# Patient Record
Sex: MALE | Race: WHITE | HISPANIC OR LATINO
[De-identification: names, ages, dates, MRNs, and addresses within clinical notes are randomized per-mention and may not be internally consistent; named-entity substitution may affect disease eponyms.]

---

## 2017-09-28 ENCOUNTER — RECORD ABSTRACTING (OUTPATIENT)
Age: 52
End: 2017-09-28

## 2017-09-28 DIAGNOSIS — J06.9 ACUTE UPPER RESPIRATORY INFECTION, UNSPECIFIED: ICD-10-CM

## 2017-09-28 DIAGNOSIS — Z87.09 PERSONAL HISTORY OF OTHER DISEASES OF THE RESPIRATORY SYSTEM: ICD-10-CM

## 2017-09-28 DIAGNOSIS — M25.472 EFFUSION, RIGHT ANKLE: ICD-10-CM

## 2017-09-28 DIAGNOSIS — M25.471 EFFUSION, RIGHT ANKLE: ICD-10-CM

## 2017-09-28 DIAGNOSIS — M25.519 PAIN IN UNSPECIFIED SHOULDER: ICD-10-CM

## 2017-09-30 ENCOUNTER — APPOINTMENT (OUTPATIENT)
Dept: FAMILY MEDICINE | Facility: CLINIC | Age: 52
End: 2017-09-30
Payer: COMMERCIAL

## 2017-09-30 VITALS
SYSTOLIC BLOOD PRESSURE: 122 MMHG | HEIGHT: 68.5 IN | OXYGEN SATURATION: 97 % | HEART RATE: 62 BPM | DIASTOLIC BLOOD PRESSURE: 88 MMHG | BODY MASS INDEX: 36.41 KG/M2 | WEIGHT: 243 LBS | RESPIRATION RATE: 14 BRPM

## 2017-09-30 VITALS — DIASTOLIC BLOOD PRESSURE: 84 MMHG | SYSTOLIC BLOOD PRESSURE: 132 MMHG

## 2017-09-30 DIAGNOSIS — Z82.49 FAMILY HISTORY OF ISCHEMIC HEART DISEASE AND OTHER DISEASES OF THE CIRCULATORY SYSTEM: ICD-10-CM

## 2017-09-30 DIAGNOSIS — Z81.8 FAMILY HISTORY OF OTHER MENTAL AND BEHAVIORAL DISORDERS: ICD-10-CM

## 2017-09-30 DIAGNOSIS — Z82.5 FAMILY HISTORY OF ASTHMA AND OTHER CHRONIC LOWER RESPIRATORY DISEASES: ICD-10-CM

## 2017-09-30 DIAGNOSIS — F15.90 OTHER STIMULANT USE, UNSPECIFIED, UNCOMPLICATED: ICD-10-CM

## 2017-09-30 PROCEDURE — 99213 OFFICE O/P EST LOW 20 MIN: CPT

## 2017-09-30 RX ORDER — AZELASTINE HYDROCHLORIDE AND FLUTICASONE PROPIONATE 137; 50 UG/1; UG/1
137-50 SPRAY, METERED NASAL
Refills: 0 | Status: DISCONTINUED | COMMUNITY
End: 2017-09-30

## 2018-01-13 ENCOUNTER — APPOINTMENT (OUTPATIENT)
Dept: FAMILY MEDICINE | Facility: CLINIC | Age: 53
End: 2018-01-13
Payer: COMMERCIAL

## 2018-01-13 VITALS — DIASTOLIC BLOOD PRESSURE: 82 MMHG | SYSTOLIC BLOOD PRESSURE: 120 MMHG

## 2018-01-13 VITALS
HEIGHT: 68.5 IN | SYSTOLIC BLOOD PRESSURE: 122 MMHG | HEART RATE: 65 BPM | DIASTOLIC BLOOD PRESSURE: 92 MMHG | OXYGEN SATURATION: 96 % | WEIGHT: 247 LBS | RESPIRATION RATE: 14 BRPM | BODY MASS INDEX: 37.01 KG/M2

## 2018-01-13 PROCEDURE — 99213 OFFICE O/P EST LOW 20 MIN: CPT

## 2018-01-13 RX ORDER — FLUTICASONE PROPIONATE 50 UG/1
50 SPRAY, METERED NASAL
Qty: 16 | Refills: 0 | Status: DISCONTINUED | COMMUNITY
Start: 2017-04-08 | End: 2018-01-13

## 2018-01-13 RX ORDER — PSYLLIUM HUSK 0.4 G
CAPSULE ORAL
Refills: 0 | Status: ACTIVE | COMMUNITY

## 2018-01-13 RX ORDER — METOPROLOL SUCCINATE 50 MG/1
50 TABLET, EXTENDED RELEASE ORAL
Qty: 30 | Refills: 0 | Status: DISCONTINUED | COMMUNITY
Start: 2017-07-10 | End: 2018-01-13

## 2018-04-21 ENCOUNTER — APPOINTMENT (OUTPATIENT)
Dept: FAMILY MEDICINE | Facility: CLINIC | Age: 53
End: 2018-04-21
Payer: COMMERCIAL

## 2018-04-21 ENCOUNTER — LABORATORY RESULT (OUTPATIENT)
Age: 53
End: 2018-04-21

## 2018-04-21 VITALS — SYSTOLIC BLOOD PRESSURE: 122 MMHG | DIASTOLIC BLOOD PRESSURE: 82 MMHG

## 2018-04-21 VITALS
HEIGHT: 68.5 IN | OXYGEN SATURATION: 97 % | BODY MASS INDEX: 37.01 KG/M2 | WEIGHT: 247 LBS | HEART RATE: 73 BPM | DIASTOLIC BLOOD PRESSURE: 98 MMHG | RESPIRATION RATE: 14 BRPM | SYSTOLIC BLOOD PRESSURE: 132 MMHG

## 2018-04-21 PROCEDURE — 99214 OFFICE O/P EST MOD 30 MIN: CPT | Mod: 25

## 2018-04-21 PROCEDURE — 36415 COLL VENOUS BLD VENIPUNCTURE: CPT

## 2018-04-21 RX ORDER — ACYCLOVIR 400 MG/1
400 TABLET ORAL 4 TIMES DAILY
Qty: 20 | Refills: 11 | Status: DISCONTINUED | COMMUNITY
Start: 2018-01-13 | End: 2018-04-21

## 2018-04-22 LAB
T3 SERPL-MCNC: 113 NG/DL
T3FREE SERPL-MCNC: 3 PG/ML
T4 FREE SERPL-MCNC: 0.9 NG/DL
T4 SERPL-MCNC: 6.2 UG/DL
THYROPEROXIDASE AB SERPL IA-ACNC: <10 IU/ML
TSH SERPL-ACNC: 0.74 UIU/ML

## 2018-04-24 ENCOUNTER — RESULT REVIEW (OUTPATIENT)
Age: 53
End: 2018-04-24

## 2018-04-24 LAB — ANA SER IF-ACNC: NEGATIVE

## 2018-04-26 ENCOUNTER — RESULT REVIEW (OUTPATIENT)
Age: 53
End: 2018-04-26

## 2018-04-26 LAB
BARLEY IGE QN: 0.13 KUA/L
CHERRY IGE QN: 1.03 KUA/L
COW MILK IGE QN: 9.76 KUA/L
CRAB IGE QN: <0.1 KUA/L
DEPRECATED BARLEY IGE RAST QL: NORMAL
DEPRECATED CHERRY IGE RAST QL: ABNORMAL
DEPRECATED COW MILK IGE RAST QL: ABNORMAL
DEPRECATED CRAB IGE RAST QL: 0
DEPRECATED EGG WHITE IGE RAST QL: NORMAL
DEPRECATED OAT IGE RAST QL: NORMAL
DEPRECATED PEANUT IGE RAST QL: NORMAL
DEPRECATED RYE IGE RAST QL: NORMAL
DEPRECATED SOYBEAN IGE RAST QL: NORMAL
DEPRECATED WHEAT IGE RAST QL: NORMAL
EGG WHITE IGE QN: 0.25 KUA/L
OAT IGE QN: 0.28 KUA/L
PEANUT IGE QN: 0.28 KUA/L
RYE IGE QN: 0.15 KUA/L
SOYBEAN IGE QN: 0.23 KUA/L
TOTAL IGE SMQN RAST: 372 KU/L
WHEAT IGE QN: 0.28 KUA/L

## 2018-07-21 ENCOUNTER — APPOINTMENT (OUTPATIENT)
Dept: FAMILY MEDICINE | Facility: CLINIC | Age: 53
End: 2018-07-21
Payer: COMMERCIAL

## 2018-07-21 VITALS — SYSTOLIC BLOOD PRESSURE: 122 MMHG | DIASTOLIC BLOOD PRESSURE: 80 MMHG

## 2018-07-21 VITALS
DIASTOLIC BLOOD PRESSURE: 82 MMHG | SYSTOLIC BLOOD PRESSURE: 118 MMHG | HEART RATE: 64 BPM | HEIGHT: 68.5 IN | RESPIRATION RATE: 14 BRPM | BODY MASS INDEX: 37.01 KG/M2 | WEIGHT: 247 LBS

## 2018-07-21 PROCEDURE — 99213 OFFICE O/P EST LOW 20 MIN: CPT

## 2018-07-21 NOTE — HISTORY OF PRESENT ILLNESS
[FreeTextEntry1] : 3 month check [de-identified] : He is feeling well.  He denies any health changes.

## 2018-07-21 NOTE — ASSESSMENT
[FreeTextEntry1] : he will continue current dose of metoprolol for stable hypertension and try to follow an active and healthy lifestyle and schedule a wellness visit with fasting labs in 3 months

## 2018-10-09 ENCOUNTER — RX RENEWAL (OUTPATIENT)
Age: 53
End: 2018-10-09

## 2018-10-20 ENCOUNTER — APPOINTMENT (OUTPATIENT)
Dept: FAMILY MEDICINE | Facility: CLINIC | Age: 53
End: 2018-10-20
Payer: COMMERCIAL

## 2018-10-20 ENCOUNTER — NON-APPOINTMENT (OUTPATIENT)
Age: 53
End: 2018-10-20

## 2018-10-20 VITALS
RESPIRATION RATE: 14 BRPM | WEIGHT: 247 LBS | HEIGHT: 68.5 IN | SYSTOLIC BLOOD PRESSURE: 118 MMHG | DIASTOLIC BLOOD PRESSURE: 80 MMHG | OXYGEN SATURATION: 98 % | TEMPERATURE: 98.6 F | HEART RATE: 87 BPM | BODY MASS INDEX: 37.01 KG/M2

## 2018-10-20 VITALS — DIASTOLIC BLOOD PRESSURE: 78 MMHG | SYSTOLIC BLOOD PRESSURE: 122 MMHG

## 2018-10-20 DIAGNOSIS — Z86.19 PERSONAL HISTORY OF OTHER INFECTIOUS AND PARASITIC DISEASES: ICD-10-CM

## 2018-10-20 DIAGNOSIS — Z87.2 PERSONAL HISTORY OF DISEASES OF THE SKIN AND SUBCUTANEOUS TISSUE: ICD-10-CM

## 2018-10-20 DIAGNOSIS — M67.432 GANGLION, LEFT WRIST: ICD-10-CM

## 2018-10-20 DIAGNOSIS — N52.9 MALE ERECTILE DYSFUNCTION, UNSPECIFIED: ICD-10-CM

## 2018-10-20 DIAGNOSIS — M67.472 GANGLION, LEFT ANKLE AND FOOT: ICD-10-CM

## 2018-10-20 LAB
BILIRUB UR QL STRIP: NEGATIVE
GLUCOSE UR-MCNC: NEGATIVE
HCG UR QL: 0.2 EU/DL
HGB UR QL STRIP.AUTO: NEGATIVE
KETONES UR-MCNC: NEGATIVE
LEUKOCYTE ESTERASE UR QL STRIP: NEGATIVE
NITRITE UR QL STRIP: NEGATIVE
PH UR STRIP: 7
PROT UR STRIP-MCNC: NEGATIVE
SP GR UR STRIP: 1.01

## 2018-10-20 PROCEDURE — G0008: CPT

## 2018-10-20 PROCEDURE — 99396 PREV VISIT EST AGE 40-64: CPT | Mod: 25

## 2018-10-20 PROCEDURE — 93000 ELECTROCARDIOGRAM COMPLETE: CPT

## 2018-10-20 PROCEDURE — 90686 IIV4 VACC NO PRSV 0.5 ML IM: CPT

## 2018-10-20 PROCEDURE — 81003 URINALYSIS AUTO W/O SCOPE: CPT | Mod: QW

## 2018-10-20 PROCEDURE — 36415 COLL VENOUS BLD VENIPUNCTURE: CPT

## 2018-10-20 NOTE — HISTORY OF PRESENT ILLNESS
[FreeTextEntry1] : Pt presents for cpe [de-identified] : He is feeling well.  He was released from the care of [facility] after the lymph node biopsy was negative and a period of surveillance.  He would like to renew Levitra that he takes occasionally

## 2018-10-20 NOTE — PHYSICAL EXAM
[No Acute Distress] : no acute distress [Well Nourished] : well nourished [Well Developed] : well developed [Well-Appearing] : well-appearing [Normal Voice/Communication] : normal voice/communication [Normal Sclera/Conjunctiva] : normal sclera/conjunctiva [Normal Outer Ear/Nose] : the outer ears and nose were normal in appearance [Normal Oropharynx] : the oropharynx was normal [Normal TMs] : both tympanic membranes were normal [Supple] : supple [Thyroid Normal, No Nodules] : the thyroid was normal and there were no nodules present [No Respiratory Distress] : no respiratory distress  [Clear to Auscultation] : lungs were clear to auscultation bilaterally [No Accessory Muscle Use] : no accessory muscle use [Normal Rate] : normal rate  [Regular Rhythm] : with a regular rhythm [Normal S1, S2] : normal S1 and S2 [No Murmur] : no murmur heard [No Carotid Bruits] : no carotid bruits [Soft] : abdomen soft [Non Tender] : non-tender [Non-distended] : non-distended [No HSM] : no HSM [Normal Bowel Sounds] : normal bowel sounds [Speech Grossly Normal] : speech grossly normal [Memory Grossly Normal] : memory grossly normal [Normal Affect] : the affect was normal [Normal Mood] : the mood was normal [Normal Insight/Judgement] : insight and judgment were intact [de-identified] : soft, mobile mass in left anterior neck

## 2018-10-20 NOTE — ASSESSMENT
[FreeTextEntry1] : flu vaccine was given, medications were renewed, fasting labs will be drawn in the office today, I ordered a neck ultrasound

## 2018-10-20 NOTE — HEALTH RISK ASSESSMENT
[Good] : ~his/her~  mood as  good [Intercurrent Urgi Care visits] : went to urgent care [No falls in past year] : Patient reported no falls in the past year [0] : 2) Feeling down, depressed, or hopeless: Not at all (0) [HIV Test offered] : HIV Test offered [Hepatitis C test offered] : Hepatitis C test offered [None] : None [With Family] : lives with family [Employed] : employed [College] : College [] :  [Sexually Active] : sexually active [Feels Safe at Home] : Feels safe at home [Fully functional (bathing, dressing, toileting, transferring, walking, feeding)] : Fully functional (bathing, dressing, toileting, transferring, walking, feeding) [Fully functional (using the telephone, shopping, preparing meals, housekeeping, doing laundry, using] : Fully functional and needs no help or supervision to perform IADLs (using the telephone, shopping, preparing meals, housekeeping, doing laundry, using transportation, managing medications and managing finances) [Smoke Detector] : smoke detector [Carbon Monoxide Detector] : carbon monoxide detector [Seat Belt] :  uses seat belt [Sunscreen] : uses sunscreen [Patient declined discussion] : Patient declined discussion [FreeTextEntry1] : none [] : No [de-identified] : hand laceration [de-identified] : none [Change in mental status noted] : No change in mental status noted [Language] : denies difficulty with language [Behavior] : denies difficulty with behavior [Learning/Retaining New Information] : denies difficulty learning/retaining new information [Handling Complex Tasks] : denies difficulty handling complex tasks [Reasoning] : denies difficulty with reasoning [Spatial Ability and Orientation] : denies difficulty with spatial ability and orientation [Reports changes in hearing] : Reports no changes in hearing [Reports changes in vision] : Reports no changes in vision [Reports changes in dental health] : Reports no changes in dental health [Guns at Home] : no guns at home [Safety elements used in home] : no safety elements used in home [Travel to Developing Areas] : does not  travel to developing areas [TB Exposure] : is not being exposed to tuberculosis [Caregiver Concerns] : does not have caregiver concerns [ColonoscopyComments] : plans to schedule one [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

## 2018-10-22 DIAGNOSIS — Z13.29 ENCOUNTER FOR SCREENING FOR OTHER SUSPECTED ENDOCRINE DISORDER: ICD-10-CM

## 2018-10-22 DIAGNOSIS — Z12.5 ENCOUNTER FOR SCREENING FOR MALIGNANT NEOPLASM OF PROSTATE: ICD-10-CM

## 2018-10-22 DIAGNOSIS — Z13.1 ENCOUNTER FOR SCREENING FOR DIABETES MELLITUS: ICD-10-CM

## 2018-10-22 DIAGNOSIS — Z11.4 ENCOUNTER FOR SCREENING FOR HUMAN IMMUNODEFICIENCY VIRUS [HIV]: ICD-10-CM

## 2018-10-22 DIAGNOSIS — Z11.59 ENCOUNTER FOR SCREENING FOR OTHER VIRAL DISEASES: ICD-10-CM

## 2018-10-22 DIAGNOSIS — Z13.220 ENCOUNTER FOR SCREENING FOR LIPOID DISORDERS: ICD-10-CM

## 2018-10-22 DIAGNOSIS — E78.6 LIPOPROTEIN DEFICIENCY: ICD-10-CM

## 2018-10-22 DIAGNOSIS — Z13.0 ENCOUNTER FOR SCREENING FOR DISEASES OF THE BLOOD AND BLOOD-FORMING ORGANS AND CERTAIN DISORDERS INVOLVING THE IMMUNE MECHANISM: ICD-10-CM

## 2018-10-22 LAB
25(OH)D3 SERPL-MCNC: 26 NG/ML
ALBUMIN SERPL ELPH-MCNC: 4.4 G/DL
ALP BLD-CCNC: 81 U/L
ALT SERPL-CCNC: 42 U/L
ANION GAP SERPL CALC-SCNC: 13 MMOL/L
AST SERPL-CCNC: 37 U/L
BASOPHILS # BLD AUTO: 0.03 K/UL
BASOPHILS NFR BLD AUTO: 0.6 %
BILIRUB SERPL-MCNC: 0.3 MG/DL
BUN SERPL-MCNC: 15 MG/DL
CALCIUM SERPL-MCNC: 9.8 MG/DL
CHLORIDE SERPL-SCNC: 102 MMOL/L
CHOLEST SERPL-MCNC: 158 MG/DL
CHOLEST/HDLC SERPL: 4.8 RATIO
CO2 SERPL-SCNC: 27 MMOL/L
CREAT SERPL-MCNC: 1.21 MG/DL
EOSINOPHIL # BLD AUTO: 0.33 K/UL
EOSINOPHIL NFR BLD AUTO: 6.8 %
FERRITIN SERPL-MCNC: 112 NG/ML
FOLATE SERPL-MCNC: >20 NG/ML
GLUCOSE SERPL-MCNC: 103 MG/DL
HBA1C MFR BLD HPLC: 5.9 %
HCT VFR BLD CALC: 44.1 %
HCV AB SER QL: NONREACTIVE
HCV S/CO RATIO: 0.11 S/CO
HDLC SERPL-MCNC: 33 MG/DL
HGB BLD-MCNC: 15.3 G/DL
HIV1+2 AB SPEC QL IA.RAPID: NONREACTIVE
IMM GRANULOCYTES NFR BLD AUTO: 0 %
LDLC SERPL CALC-MCNC: 99 MG/DL
LYMPHOCYTES # BLD AUTO: 1.77 K/UL
LYMPHOCYTES NFR BLD AUTO: 36.4 %
MAN DIFF?: NORMAL
MCHC RBC-ENTMCNC: 31.1 PG
MCHC RBC-ENTMCNC: 34.7 GM/DL
MCV RBC AUTO: 89.6 FL
MONOCYTES # BLD AUTO: 0.34 K/UL
MONOCYTES NFR BLD AUTO: 7 %
NEUTROPHILS # BLD AUTO: 2.39 K/UL
NEUTROPHILS NFR BLD AUTO: 49.2 %
PLATELET # BLD AUTO: 213 K/UL
POTASSIUM SERPL-SCNC: 4.7 MMOL/L
PROT SERPL-MCNC: 7 G/DL
PSA SERPL-MCNC: 0.49 NG/ML
RBC # BLD: 4.92 M/UL
RBC # FLD: 13.2 %
SODIUM SERPL-SCNC: 142 MMOL/L
TRIGL SERPL-MCNC: 130 MG/DL
TSH SERPL-ACNC: 1.15 UIU/ML
VIT B12 SERPL-MCNC: 520 PG/ML
WBC # FLD AUTO: 4.86 K/UL

## 2018-11-12 ENCOUNTER — RX RENEWAL (OUTPATIENT)
Age: 53
End: 2018-11-12

## 2018-11-12 RX ORDER — VARDENAFIL HYDROCHLORIDE 20 MG/1
20 TABLET, FILM COATED ORAL
Qty: 12 | Refills: 11 | Status: DISCONTINUED | COMMUNITY
Start: 2018-10-20 | End: 2018-11-12

## 2019-01-14 ENCOUNTER — MEDICATION RENEWAL (OUTPATIENT)
Age: 54
End: 2019-01-14

## 2019-01-19 ENCOUNTER — APPOINTMENT (OUTPATIENT)
Dept: FAMILY MEDICINE | Facility: CLINIC | Age: 54
End: 2019-01-19
Payer: COMMERCIAL

## 2019-01-19 VITALS
OXYGEN SATURATION: 96 % | DIASTOLIC BLOOD PRESSURE: 76 MMHG | WEIGHT: 241 LBS | BODY MASS INDEX: 36.11 KG/M2 | HEART RATE: 78 BPM | HEIGHT: 68.5 IN | SYSTOLIC BLOOD PRESSURE: 124 MMHG

## 2019-01-19 PROCEDURE — 99213 OFFICE O/P EST LOW 20 MIN: CPT

## 2019-01-19 NOTE — REVIEW OF SYSTEMS
[Recent Change In Weight] : ~T recent weight change [Negative] : Heme/Lymph [FreeTextEntry2] : weight loss

## 2019-01-19 NOTE — ASSESSMENT
[FreeTextEntry1] : he was advised to schedule the neck ultrasound, he was advised to continue the low carb diet and to add regular exercise, medications were renewed, he will follow up in 3 months for a BP check and a HgA1C

## 2019-01-19 NOTE — HISTORY OF PRESENT ILLNESS
[FreeTextEntry1] : Pt presents for 3 month follow up [de-identified] : He has been following a lower carb diet, he cut down on bread and sweets and has decreased the amount of sugar in his coffee.  He has lost a little weight.  He plans to start exercising.  He plans to schedule the neck ultrasound.

## 2019-02-07 ENCOUNTER — RX RENEWAL (OUTPATIENT)
Age: 54
End: 2019-02-07

## 2019-04-20 ENCOUNTER — APPOINTMENT (OUTPATIENT)
Dept: FAMILY MEDICINE | Facility: CLINIC | Age: 54
End: 2019-04-20
Payer: COMMERCIAL

## 2019-04-20 VITALS
SYSTOLIC BLOOD PRESSURE: 122 MMHG | OXYGEN SATURATION: 97 % | DIASTOLIC BLOOD PRESSURE: 86 MMHG | BODY MASS INDEX: 35.21 KG/M2 | HEART RATE: 79 BPM | HEIGHT: 68.5 IN | WEIGHT: 235 LBS | RESPIRATION RATE: 14 BRPM

## 2019-04-20 DIAGNOSIS — R59.0 LOCALIZED ENLARGED LYMPH NODES: ICD-10-CM

## 2019-04-20 PROCEDURE — 99214 OFFICE O/P EST MOD 30 MIN: CPT

## 2019-04-20 NOTE — PLAN
[FreeTextEntry1] : will defer repeat labs until october, he will continue the healthy diet and weight loss and incorporate exercise, he will take amoxicillin and use fluticasone as directed, I renewed metoprolol, I ordered a repeat neck ultrasound and biopsy, he will follow up in 3 months

## 2019-04-20 NOTE — REVIEW OF SYSTEMS
[Nasal Discharge] : nasal discharge [Postnasal Drip] : postnasal drip [Swollen Glands] : swollen glands [Negative] : Psychiatric [Fever] : no fever [Chills] : no chills [Earache] : no earache [Sore Throat] : no sore throat [Shortness Of Breath] : no shortness of breath [Wheezing] : no wheezing [Cough] : no cough

## 2019-04-20 NOTE — PHYSICAL EXAM
[No Acute Distress] : no acute distress [Well Developed] : well developed [Well Nourished] : well nourished [Normal Voice/Communication] : normal voice/communication [Well-Appearing] : well-appearing [Normal Outer Ear/Nose] : the outer ears and nose were normal in appearance [Normal Sclera/Conjunctiva] : normal sclera/conjunctiva [Normal TMs] : both tympanic membranes were normal [Normal Oropharynx] : the oropharynx was normal [No Respiratory Distress] : no respiratory distress  [Supple] : supple [Clear to Auscultation] : lungs were clear to auscultation bilaterally [No Accessory Muscle Use] : no accessory muscle use [Normal Rate] : normal rate  [Regular Rhythm] : with a regular rhythm [Speech Grossly Normal] : speech grossly normal [Normal S1, S2] : normal S1 and S2 [Memory Grossly Normal] : memory grossly normal [Normal Affect] : the affect was normal [Normal Mood] : the mood was normal [Normal Insight/Judgement] : insight and judgment were intact [de-identified] : erythema and edema in nasal passages bilaterally [de-identified] : large, mobile left submandibular node or cluster of nodes

## 2019-04-20 NOTE — HISTORY OF PRESENT ILLNESS
[FreeTextEntry1] : Pt presents for 3 month follow up [de-identified] : He has decreased intake of sweets and carbs and has lost weight and plans to start exercising.  He has been having a lot of sinus congestion due to allergies and now has discolored nasal discharge and sinus pressure.  He would like to schedule the neck ultrasound and another fine needle biopsy of the lymph node at the same time.

## 2019-06-05 ENCOUNTER — RESULT REVIEW (OUTPATIENT)
Age: 54
End: 2019-06-05

## 2019-06-09 ENCOUNTER — TRANSCRIPTION ENCOUNTER (OUTPATIENT)
Age: 54
End: 2019-06-09

## 2019-07-03 ENCOUNTER — APPOINTMENT (OUTPATIENT)
Dept: COLORECTAL SURGERY | Facility: CLINIC | Age: 54
End: 2019-07-03

## 2019-07-20 ENCOUNTER — APPOINTMENT (OUTPATIENT)
Dept: FAMILY MEDICINE | Facility: CLINIC | Age: 54
End: 2019-07-20

## 2019-08-08 ENCOUNTER — RX RENEWAL (OUTPATIENT)
Age: 54
End: 2019-08-08

## 2019-08-12 ENCOUNTER — APPOINTMENT (OUTPATIENT)
Dept: FAMILY MEDICINE | Facility: CLINIC | Age: 54
End: 2019-08-12
Payer: COMMERCIAL

## 2019-08-12 VITALS
OXYGEN SATURATION: 97 % | HEART RATE: 83 BPM | RESPIRATION RATE: 14 BRPM | DIASTOLIC BLOOD PRESSURE: 88 MMHG | HEIGHT: 68.5 IN | BODY MASS INDEX: 33.11 KG/M2 | SYSTOLIC BLOOD PRESSURE: 120 MMHG | WEIGHT: 221 LBS

## 2019-08-12 VITALS — DIASTOLIC BLOOD PRESSURE: 72 MMHG | SYSTOLIC BLOOD PRESSURE: 120 MMHG

## 2019-08-12 DIAGNOSIS — J01.90 ACUTE SINUSITIS, UNSPECIFIED: ICD-10-CM

## 2019-08-12 PROCEDURE — 99213 OFFICE O/P EST LOW 20 MIN: CPT

## 2019-08-12 RX ORDER — AMOXICILLIN 875 MG/1
875 TABLET, FILM COATED ORAL
Qty: 20 | Refills: 0 | Status: DISCONTINUED | COMMUNITY
Start: 2019-04-20 | End: 2019-08-12

## 2019-08-12 NOTE — PLAN
[FreeTextEntry1] : I renewed metoprolol and fluticasone, he [place] [facility] [place] and will follow up with me in 3 months for a CPE and BP check, he was given emotional support

## 2019-08-12 NOTE — PHYSICAL EXAM
[No Acute Distress] : no acute distress [Well Nourished] : well nourished [Well Developed] : well developed [Well-Appearing] : well-appearing [Normal Voice/Communication] : normal voice/communication [No Respiratory Distress] : no respiratory distress  [No Accessory Muscle Use] : no accessory muscle use [Clear to Auscultation] : lungs were clear to auscultation bilaterally [Normal Rate] : normal rate  [Regular Rhythm] : with a regular rhythm [Normal S1, S2] : normal S1 and S2 [Speech Grossly Normal] : speech grossly normal [Memory Grossly Normal] : memory grossly normal [Normal Mood] : the mood was normal [Normal Insight/Judgement] : insight and judgment were intact

## 2019-08-12 NOTE — HISTORY OF PRESENT ILLNESS
[de-identified] : He was diagnosed with colon cancer with mets to the liver and lungs.  [place] [facility] [place] every 2 weeks.  He is tolerating it pretty well so far.  He had been following a healthy diet and was slowly losing weight and then lost a little more weight with his digestive symptoms and now his weight has stabilized. [FreeTextEntry1] : Patient present for med check\par

## 2019-08-12 NOTE — REVIEW OF SYSTEMS
[Recent Change In Weight] : ~T recent weight change [Fatigue] : fatigue [Diarrhea] : diarrhea [Negative] : Heme/Lymph

## 2019-09-14 ENCOUNTER — APPOINTMENT (OUTPATIENT)
Dept: FAMILY MEDICINE | Facility: CLINIC | Age: 54
End: 2019-09-14

## 2019-11-16 ENCOUNTER — APPOINTMENT (OUTPATIENT)
Dept: FAMILY MEDICINE | Facility: CLINIC | Age: 54
End: 2019-11-16
Payer: COMMERCIAL

## 2019-11-16 ENCOUNTER — NON-APPOINTMENT (OUTPATIENT)
Age: 54
End: 2019-11-16

## 2019-11-16 VITALS
DIASTOLIC BLOOD PRESSURE: 100 MMHG | SYSTOLIC BLOOD PRESSURE: 152 MMHG | WEIGHT: 235 LBS | OXYGEN SATURATION: 96 % | RESPIRATION RATE: 14 BRPM | HEIGHT: 68.5 IN | BODY MASS INDEX: 35.21 KG/M2 | HEART RATE: 72 BPM

## 2019-11-16 VITALS — SYSTOLIC BLOOD PRESSURE: 134 MMHG | DIASTOLIC BLOOD PRESSURE: 100 MMHG

## 2019-11-16 DIAGNOSIS — Z00.00 ENCOUNTER FOR GENERAL ADULT MEDICAL EXAMINATION W/OUT ABNORMAL FINDINGS: ICD-10-CM

## 2019-11-16 LAB
BILIRUB UR QL STRIP: NEGATIVE
GLUCOSE UR-MCNC: NEGATIVE
HCG UR QL: 2 EU/DL
HGB UR QL STRIP.AUTO: NEGATIVE
KETONES UR-MCNC: NEGATIVE
LEUKOCYTE ESTERASE UR QL STRIP: NEGATIVE
NITRITE UR QL STRIP: NEGATIVE
PH UR STRIP: 6.5
PROT UR STRIP-MCNC: 30
SP GR UR STRIP: 1.02

## 2019-11-16 PROCEDURE — 99396 PREV VISIT EST AGE 40-64: CPT | Mod: 25

## 2019-11-16 PROCEDURE — 36415 COLL VENOUS BLD VENIPUNCTURE: CPT

## 2019-11-16 PROCEDURE — 93000 ELECTROCARDIOGRAM COMPLETE: CPT

## 2019-11-16 PROCEDURE — 81003 URINALYSIS AUTO W/O SCOPE: CPT | Mod: QW

## 2019-11-16 NOTE — REVIEW OF SYSTEMS
[Recent Change In Weight] : ~T recent weight change [Negative] : Heme/Lymph [FreeTextEntry2] : weight gain [de-identified] : possible fungal infection of right big toe

## 2019-11-16 NOTE — PLAN
[FreeTextEntry1] : labs will be drawn in the office today to further assess his health, he will continue current dose of metoprolol ER and will add amlodipine 5 mg daily, he was advised that the nail doesn't appear to have a fungal infection and the changes may be due to chemo

## 2019-11-16 NOTE — PHYSICAL EXAM
[No Acute Distress] : no acute distress [Well Nourished] : well nourished [Well Developed] : well developed [Well-Appearing] : well-appearing [Normal Voice/Communication] : normal voice/communication [Normal Sclera/Conjunctiva] : normal sclera/conjunctiva [Normal TMs] : both tympanic membranes were normal [Normal Outer Ear/Nose] : the outer ears and nose were normal in appearance [Normal Oropharynx] : the oropharynx was normal [Supple] : supple [No JVD] : no jugular venous distention [No Lymphadenopathy] : no lymphadenopathy [No Respiratory Distress] : no respiratory distress  [Normal Rate] : normal rate  [Clear to Auscultation] : lungs were clear to auscultation bilaterally [No Accessory Muscle Use] : no accessory muscle use [Normal S1, S2] : normal S1 and S2 [No Murmur] : no murmur heard [Regular Rhythm] : with a regular rhythm [No Carotid Bruits] : no carotid bruits [No Edema] : there was no peripheral edema [Soft] : abdomen soft [Non-distended] : non-distended [No HSM] : no HSM [Non Tender] : non-tender [Normal Bowel Sounds] : normal bowel sounds [Coordination Grossly Intact] : coordination grossly intact [Memory Grossly Normal] : memory grossly normal [No Focal Deficits] : no focal deficits [Speech Grossly Normal] : speech grossly normal [Normal Insight/Judgement] : insight and judgment were intact [Normal Mood] : the mood was normal [Normal Affect] : the affect was normal [de-identified] : pitting of right big toe, no yellow discoloration or thickening noted

## 2019-11-16 NOTE — HEALTH RISK ASSESSMENT
[Poor] : ~his/her~ current health as poor [Good] : ~his/her~  mood as  good [0-5] : 0-5 [No falls in past year] : Patient reported no falls in the past year [HIV test declined] : HIV test declined [None] : None [With Family] : lives with family [College] : College [Employed] : employed [] :  [Feels Safe at Home] : Feels safe at home [Fully functional (bathing, dressing, toileting, transferring, walking, feeding)] : Fully functional (bathing, dressing, toileting, transferring, walking, feeding) [Fully functional (using the telephone, shopping, preparing meals, housekeeping, doing laundry, using] : Fully functional and needs no help or supervision to perform IADLs (using the telephone, shopping, preparing meals, housekeeping, doing laundry, using transportation, managing medications and managing finances) [Reports normal functional visual acuity (ie: able to read med bottle)] : Reports normal functional visual acuity [Smoke Detector] : smoke detector [Carbon Monoxide Detector] : carbon monoxide detector [Safety elements used in home] : safety elements used in home [Seat Belt] :  uses seat belt [Sunscreen] : uses sunscreen [Patient/Caregiver not ready to engage] : Patient/Caregiver not ready to engage [] : No [FreeTextEntry1] : BP is elevated, possible fungus on right big toenail [de-identified] : nothing recently [de-identified] : watching his sugars and carbs [de-identified] : Dr Grace Boston (Onc) [de-identified] : tries to stay active [Change in mental status noted] : No change in mental status noted [Language] : denies difficulty with language [Learning/Retaining New Information] : denies difficulty learning/retaining new information [Behavior] : denies difficulty with behavior [Reports changes in hearing] : Reports no changes in hearing [Handling Complex Tasks] : denies difficulty handling complex tasks [Spatial Ability and Orientation] : denies difficulty with spatial ability and orientation [Reasoning] : denies difficulty with reasoning [Reports changes in vision] : Reports no changes in vision [Guns at Home] : no guns at home [Reports changes in dental health] : Reports no changes in dental health [Travel to Developing Areas] : does not  travel to developing areas [TB Exposure] : is not being exposed to tuberculosis [Caregiver Concerns] : does not have caregiver concerns [HIVComments] : [place] [facility] [place]FreeTextEntry2] :  [AdvancecareDate] : 11/19

## 2019-11-16 NOTE — HISTORY OF PRESENT ILLNESS
[FreeTextEntry1] : patient presents for physical\par  [de-identified] : He is still receiving chemotherapy every 2 weeks except for this week as his platelet count and WBC counts were low.  His weight had dropped initially but since then he has gained weight.

## 2019-11-17 ENCOUNTER — MOBILE ON CALL (OUTPATIENT)
Age: 54
End: 2019-11-17

## 2019-11-18 LAB
ALBUMIN SERPL ELPH-MCNC: 4.1 G/DL
ALP BLD-CCNC: 103 U/L
ALT SERPL-CCNC: 36 U/L
ANION GAP SERPL CALC-SCNC: 12 MMOL/L
AST SERPL-CCNC: 31 U/L
BILIRUB SERPL-MCNC: 0.5 MG/DL
BUN SERPL-MCNC: 16 MG/DL
CALCIUM SERPL-MCNC: 9 MG/DL
CHLORIDE SERPL-SCNC: 104 MMOL/L
CHOLEST SERPL-MCNC: 169 MG/DL
CHOLEST/HDLC SERPL: 4.6 RATIO
CO2 SERPL-SCNC: 26 MMOL/L
CREAT SERPL-MCNC: 1.04 MG/DL
ESTIMATED AVERAGE GLUCOSE: 137 MG/DL
GLUCOSE SERPL-MCNC: 118 MG/DL
HBA1C MFR BLD HPLC: 6.4 %
HDLC SERPL-MCNC: 37 MG/DL
LDLC SERPL CALC-MCNC: 89 MG/DL
POTASSIUM SERPL-SCNC: 4.2 MMOL/L
PROT SERPL-MCNC: 6.9 G/DL
SODIUM SERPL-SCNC: 142 MMOL/L
TRIGL SERPL-MCNC: 216 MG/DL

## 2019-11-21 ENCOUNTER — APPOINTMENT (OUTPATIENT)
Dept: FAMILY MEDICINE | Facility: CLINIC | Age: 54
End: 2019-11-21
Payer: COMMERCIAL

## 2019-11-21 VITALS
SYSTOLIC BLOOD PRESSURE: 160 MMHG | HEIGHT: 68.5 IN | BODY MASS INDEX: 35.21 KG/M2 | DIASTOLIC BLOOD PRESSURE: 100 MMHG | HEART RATE: 72 BPM | OXYGEN SATURATION: 98 % | RESPIRATION RATE: 14 BRPM | WEIGHT: 235 LBS

## 2019-11-21 VITALS — DIASTOLIC BLOOD PRESSURE: 92 MMHG | SYSTOLIC BLOOD PRESSURE: 148 MMHG

## 2019-11-21 PROCEDURE — 99214 OFFICE O/P EST MOD 30 MIN: CPT

## 2019-11-21 NOTE — ASSESSMENT
[FreeTextEntry1] : Consulted with Dr. Carpenter, continue amlodipine 5mg daily in conjunction with metoprolol. \par RTO in 1 week for bp check \par PT will call office if bp persistently >170/100 to come in sooner or receive further instruction. If any cp, sob, palpitations occur pt will go directly to Ed for evaluation.

## 2019-11-21 NOTE — HISTORY OF PRESENT ILLNESS
[FreeTextEntry1] : pt presents for blood pressure check  [de-identified] : patient presents today with a chief complaints of high blood pressure. Reports associated headaches that come and go. States he goes to [facility] for chemo infusions, has noticed that his bp increases since he started them. He has only taken 2 doses of the amlodipine so far. His bp readings yesterday were 148/90, 171/104, 173/118, and 185/111 at 425 am which prompted him to go to the ER. Bp stabilized in ER and he was sent home. He has not taken his amlodipine yet today. Today at 1115 am bp was 160/115. Denies any chest pain, sob, palpitations, swelling, diaphoresis.

## 2019-11-27 ENCOUNTER — APPOINTMENT (OUTPATIENT)
Dept: FAMILY MEDICINE | Facility: CLINIC | Age: 54
End: 2019-11-27
Payer: COMMERCIAL

## 2019-11-27 VITALS
WEIGHT: 235 LBS | RESPIRATION RATE: 14 BRPM | HEIGHT: 68.5 IN | BODY MASS INDEX: 35.21 KG/M2 | DIASTOLIC BLOOD PRESSURE: 84 MMHG | OXYGEN SATURATION: 97 % | SYSTOLIC BLOOD PRESSURE: 132 MMHG | HEART RATE: 87 BPM

## 2019-11-27 VITALS — DIASTOLIC BLOOD PRESSURE: 86 MMHG | SYSTOLIC BLOOD PRESSURE: 146 MMHG

## 2019-11-27 DIAGNOSIS — Z87.898 PERSONAL HISTORY OF OTHER SPECIFIED CONDITIONS: ICD-10-CM

## 2019-11-27 PROCEDURE — 99213 OFFICE O/P EST LOW 20 MIN: CPT

## 2019-11-27 NOTE — ASSESSMENT
[FreeTextEntry1] : Bp elevated but improved, continue to monitor bp at home twice daily. RTO in 3 weeks for follow up. Pt will RTO sooner if bp persistently 160/90. Pt will go to ER if chest pain or sob arise. pt advised to follow low salt, low fat diet. Pt encouraged to exercise and increase weight loss efforts.\par

## 2019-11-27 NOTE — HISTORY OF PRESENT ILLNESS
[FreeTextEntry1] : Pt presents for follow up [de-identified] : Patient reports today for follow up of hypertension. Patient states he was still getting elevated reading of 160s and 170s systolic up until about 2 days ago, he tried taking 10mg of amlodipine. His blood pressures improved to 140s and 150s systolic. His headaches have now stopped. Denies chest pains, palpitations, sob, dizziness, edema.

## 2019-12-12 ENCOUNTER — APPOINTMENT (OUTPATIENT)
Dept: FAMILY MEDICINE | Facility: CLINIC | Age: 54
End: 2019-12-12
Payer: COMMERCIAL

## 2019-12-12 VITALS
DIASTOLIC BLOOD PRESSURE: 82 MMHG | HEART RATE: 73 BPM | RESPIRATION RATE: 14 BRPM | WEIGHT: 235 LBS | BODY MASS INDEX: 35.21 KG/M2 | HEIGHT: 68.5 IN | OXYGEN SATURATION: 98 % | SYSTOLIC BLOOD PRESSURE: 140 MMHG

## 2019-12-12 PROCEDURE — 99214 OFFICE O/P EST MOD 30 MIN: CPT

## 2019-12-12 NOTE — ASSESSMENT
[FreeTextEntry1] : BP well controlled today\par Edema: check echo, ?amlodipine, hctz prn - advised to drink plenty of water, pt advised to avoid salt, elevate legs above the level of the heart, get compression stocking. Pt advised to also mention it to his oncologist - he has appt with them tomorrow. Pt advised if any calf pain, chest pain, sob, erythema arises to notify the office immediately or go directly to nearest ED for urgent evaluation. Pt agreeable to plan.

## 2019-12-12 NOTE — PHYSICAL EXAM
[Normal Sclera/Conjunctiva] : normal sclera/conjunctiva [Normal Outer Ear/Nose] : the outer ears and nose were normal in appearance [Normal] : normal gait, coordination grossly intact, no focal deficits and deep tendon reflexes were 2+ and symmetric [Pedal Pulses Present] : the pedal pulses are present [de-identified] : 1+ bilateral lower extremity edema, no redness or tenderness to palpation, bilateral Homans sign negative.

## 2019-12-12 NOTE — HISTORY OF PRESENT ILLNESS
[FreeTextEntry8] : patient reports today with a chief complaint of bilateral lower extremity swelling x 2 days. States swelling was better in the morning, he noticed it at lunch. He admits he does eat cold cuts but has not had overly salty foods. Taking blood pressure meds as prescribed. Denies chest pain, sob, palpitations, calf pain, redness, weeping, numbness/tingling, weakness, headache.

## 2019-12-28 ENCOUNTER — APPOINTMENT (OUTPATIENT)
Dept: FAMILY MEDICINE | Facility: CLINIC | Age: 54
End: 2019-12-28
Payer: COMMERCIAL

## 2019-12-28 VITALS
HEIGHT: 68 IN | RESPIRATION RATE: 12 BRPM | HEART RATE: 73 BPM | DIASTOLIC BLOOD PRESSURE: 80 MMHG | SYSTOLIC BLOOD PRESSURE: 130 MMHG | OXYGEN SATURATION: 98 % | BODY MASS INDEX: 36.37 KG/M2 | WEIGHT: 240 LBS

## 2019-12-28 PROCEDURE — 99214 OFFICE O/P EST MOD 30 MIN: CPT

## 2019-12-28 RX ORDER — OMEPRAZOLE 20 MG/1
20 CAPSULE, DELAYED RELEASE ORAL
Qty: 30 | Refills: 0 | Status: COMPLETED | COMMUNITY
Start: 2019-12-04

## 2019-12-28 RX ORDER — HYDROCHLOROTHIAZIDE 12.5 MG/1
12.5 TABLET ORAL DAILY
Qty: 10 | Refills: 0 | Status: DISCONTINUED | COMMUNITY
Start: 2019-12-12 | End: 2019-12-28

## 2019-12-28 RX ORDER — AMLODIPINE BESYLATE 10 MG/1
10 TABLET ORAL
Qty: 90 | Refills: 1 | Status: DISCONTINUED | COMMUNITY
Start: 2019-11-16 | End: 2019-12-28

## 2019-12-28 NOTE — HISTORY OF PRESENT ILLNESS
[FreeTextEntry1] : pt presents for follow up  [de-identified] : Patient reports today for follow up of hypertension. Patient states bilateral lower extremity swelling persists, but has remained stable. Denies chest pains, palpitations, sob, dizziness, redness, calf pain. He had dopplers through [facility] - both negative.  He would like to switch from amlodipine, he never took hctz.

## 2019-12-28 NOTE — ASSESSMENT
[FreeTextEntry1] : RTO in 2 weeks for follow up\par Take readings at home daily \par RTO sooner if bp elevates >150/90\par Drink plenty of fluids, avoid salt.

## 2019-12-28 NOTE — PHYSICAL EXAM
[Normal Sclera/Conjunctiva] : normal sclera/conjunctiva [Normal Outer Ear/Nose] : the outer ears and nose were normal in appearance [Normal] : affect was normal and insight and judgment were intact [de-identified] : 1+ bilateral LE swelling

## 2020-01-11 ENCOUNTER — APPOINTMENT (OUTPATIENT)
Dept: FAMILY MEDICINE | Facility: CLINIC | Age: 55
End: 2020-01-11
Payer: COMMERCIAL

## 2020-01-11 VITALS
SYSTOLIC BLOOD PRESSURE: 120 MMHG | DIASTOLIC BLOOD PRESSURE: 80 MMHG | OXYGEN SATURATION: 98 % | BODY MASS INDEX: 36.22 KG/M2 | HEART RATE: 68 BPM | HEIGHT: 68 IN | WEIGHT: 239 LBS | RESPIRATION RATE: 12 BRPM

## 2020-01-11 DIAGNOSIS — R60.0 LOCALIZED EDEMA: ICD-10-CM

## 2020-01-11 PROCEDURE — 90674 CCIIV4 VAC NO PRSV 0.5 ML IM: CPT

## 2020-01-11 PROCEDURE — 99214 OFFICE O/P EST MOD 30 MIN: CPT | Mod: 25

## 2020-01-11 PROCEDURE — G0008: CPT

## 2020-01-11 NOTE — HISTORY OF PRESENT ILLNESS
[FreeTextEntry1] : pt presents for follow up  [de-identified] : Patient reports today for follow up of hypertension. Patient reports doing well overall. Tolerating losartan well without side effects. States he had a few high readings in the beginning but they have improved since then. He was 130/90 at [facility] yesterday for his chemo infusion.

## 2020-01-11 NOTE — ASSESSMENT
[FreeTextEntry1] : Vitals and exam stable \par Continue bp medication regimen\par Patient will continue low carb, low fat dietary efforts\par RTO in 3 mos or sooner if needed\par Flu shot admin to L Deltoid, tolerated well. \par

## 2020-01-18 ENCOUNTER — CLINICAL ADVICE (OUTPATIENT)
Age: 55
End: 2020-01-18

## 2020-01-25 ENCOUNTER — APPOINTMENT (OUTPATIENT)
Dept: FAMILY MEDICINE | Facility: CLINIC | Age: 55
End: 2020-01-25
Payer: COMMERCIAL

## 2020-01-25 VITALS
DIASTOLIC BLOOD PRESSURE: 82 MMHG | SYSTOLIC BLOOD PRESSURE: 124 MMHG | OXYGEN SATURATION: 98 % | BODY MASS INDEX: 35.92 KG/M2 | WEIGHT: 237 LBS | RESPIRATION RATE: 14 BRPM | HEIGHT: 68 IN | HEART RATE: 74 BPM

## 2020-01-25 DIAGNOSIS — Z92.29 PERSONAL HISTORY OF OTHER DRUG THERAPY: ICD-10-CM

## 2020-01-25 PROCEDURE — 99213 OFFICE O/P EST LOW 20 MIN: CPT

## 2020-01-25 RX ORDER — DIPHENHYDRAMINE HCL 2 %
25 CREAM (GRAM) TOPICAL
Refills: 0 | Status: DISCONTINUED | COMMUNITY
End: 2020-01-25

## 2020-01-25 NOTE — PLAN
[FreeTextEntry1] : his hypertension is stable, he will continue the amlodipine, metoprolol and losartan and follow up in april as scheduled or sooner prn

## 2020-01-25 NOTE — HISTORY OF PRESENT ILLNESS
[FreeTextEntry1] : patient presents for Blood Pressure check  [de-identified] : He has had no further headaches since starting the amlodipine.  He has had a little leg swelling but it is tolerable.

## 2020-01-25 NOTE — PHYSICAL EXAM
[No Acute Distress] : no acute distress [Well Developed] : well developed [Well Nourished] : well nourished [Well-Appearing] : well-appearing [Normal Voice/Communication] : normal voice/communication [No Respiratory Distress] : no respiratory distress  [No Accessory Muscle Use] : no accessory muscle use [Clear to Auscultation] : lungs were clear to auscultation bilaterally [Normal Rate] : normal rate  [Regular Rhythm] : with a regular rhythm [Normal S1, S2] : normal S1 and S2 [No Murmur] : no murmur heard [Coordination Grossly Intact] : coordination grossly intact [Speech Grossly Normal] : speech grossly normal [Memory Grossly Normal] : memory grossly normal [Normal Affect] : the affect was normal [Normal Insight/Judgement] : insight and judgment were intact [Normal Mood] : the mood was normal [de-identified] : slight edema of bilateral LE

## 2020-02-15 ENCOUNTER — APPOINTMENT (OUTPATIENT)
Dept: FAMILY MEDICINE | Facility: CLINIC | Age: 55
End: 2020-02-15

## 2020-04-20 ENCOUNTER — APPOINTMENT (OUTPATIENT)
Dept: FAMILY MEDICINE | Facility: CLINIC | Age: 55
End: 2020-04-20
Payer: COMMERCIAL

## 2020-04-20 PROCEDURE — 99213 OFFICE O/P EST LOW 20 MIN: CPT | Mod: 95

## 2020-04-20 NOTE — HISTORY OF PRESENT ILLNESS
[Medical Office: ([facility])___] : at the medical office located in  [Home] : at home, [unfilled] , at the time of the visit. [Patient] : the patient [Self] : self [de-identified] : After receiving verbal consent the visit was performed virtually via [employer] as the patient was unable to be seen in the office due to the COVID 19 pandemic.  He has been doing pretty well overall.  He is working from home.  He had a CT and a chemotherapy treatment last week and his BP was normal.  He hasn't been having any headaches.\par  [FreeTextEntry1] : Pt presents for a follow up of hypertension.

## 2020-04-20 NOTE — PLAN
[FreeTextEntry1] : I renewed his medications, he will follow up with oncology as scheduled and with me in 3 months or sooner prn, he was advised to continue to follow an active and healthy lifestyle and to check his BP at home 1-2 times per week

## 2020-04-20 NOTE — PHYSICAL EXAM
[No Acute Distress] : no acute distress [Well Nourished] : well nourished [Well Developed] : well developed [Normal Voice/Communication] : normal voice/communication [Normal Sclera/Conjunctiva] : normal sclera/conjunctiva [Well-Appearing] : well-appearing [No Respiratory Distress] : no respiratory distress  [Speech Grossly Normal] : speech grossly normal [Normal Mood] : the mood was normal [Normal Affect] : the affect was normal [Memory Grossly Normal] : memory grossly normal [Normal Insight/Judgement] : insight and judgment were intact

## 2020-09-01 ENCOUNTER — RX RENEWAL (OUTPATIENT)
Age: 55
End: 2020-09-01

## 2020-09-06 ENCOUNTER — RX RENEWAL (OUTPATIENT)
Age: 55
End: 2020-09-06

## 2020-11-21 ENCOUNTER — APPOINTMENT (OUTPATIENT)
Dept: FAMILY MEDICINE | Facility: CLINIC | Age: 55
End: 2020-11-21

## 2020-11-27 ENCOUNTER — APPOINTMENT (OUTPATIENT)
Dept: FAMILY MEDICINE | Facility: CLINIC | Age: 55
End: 2020-11-27
Payer: COMMERCIAL

## 2020-11-27 VITALS
OXYGEN SATURATION: 98 % | SYSTOLIC BLOOD PRESSURE: 120 MMHG | HEART RATE: 76 BPM | DIASTOLIC BLOOD PRESSURE: 80 MMHG | HEIGHT: 68 IN | RESPIRATION RATE: 14 BRPM | WEIGHT: 233 LBS | TEMPERATURE: 97.7 F | BODY MASS INDEX: 35.31 KG/M2

## 2020-11-27 DIAGNOSIS — R73.03 PREDIABETES.: ICD-10-CM

## 2020-11-27 LAB
GLUCOSE BLDC GLUCOMTR-MCNC: 467
HBA1C MFR BLD HPLC: 8.6

## 2020-11-27 PROCEDURE — 82962 GLUCOSE BLOOD TEST: CPT

## 2020-11-27 PROCEDURE — 99214 OFFICE O/P EST MOD 30 MIN: CPT | Mod: 25

## 2020-11-27 RX ORDER — BLOOD-GLUCOSE METER
W/DEVICE KIT MISCELLANEOUS
Qty: 1 | Refills: 0 | Status: ACTIVE | COMMUNITY
Start: 2020-11-27 | End: 1900-01-01

## 2020-11-27 RX ORDER — LANCETS
EACH MISCELLANEOUS
Qty: 300 | Refills: 3 | Status: ACTIVE | COMMUNITY
Start: 2020-11-27 | End: 1900-01-01

## 2020-11-27 RX ORDER — BLOOD SUGAR DIAGNOSTIC
STRIP MISCELLANEOUS TWICE DAILY
Qty: 300 | Refills: 3 | Status: ACTIVE | COMMUNITY
Start: 2020-11-27 | End: 1900-01-01

## 2020-11-27 NOTE — REVIEW OF SYSTEMS
[Fatigue] : fatigue [Recent Change In Weight] : ~T recent weight change [Frequency] : frequency [Negative] : Heme/Lymph [Headache] : no headache [de-identified] : wounds take longer to heal

## 2020-11-27 NOTE — PHYSICAL EXAM
[No Acute Distress] : no acute distress [Well Nourished] : well nourished [Well Developed] : well developed [Normal Voice/Communication] : normal voice/communication [No Respiratory Distress] : no respiratory distress  [No Accessory Muscle Use] : no accessory muscle use [Clear to Auscultation] : lungs were clear to auscultation bilaterally [Normal Rate] : normal rate  [Regular Rhythm] : with a regular rhythm [Normal S1, S2] : normal S1 and S2 [Coordination Grossly Intact] : coordination grossly intact [Speech Grossly Normal] : speech grossly normal [Memory Grossly Normal] : memory grossly normal [Normal Affect] : the affect was normal [Normal Mood] : the mood was normal [Normal Insight/Judgement] : insight and judgment were intact [No Focal Deficits] : no focal deficits [de-identified] : he has a sallow complexion

## 2020-11-27 NOTE — PLAN
[FreeTextEntry1] : he was advised to follow a strict diabetic diet, to drink only water, to schedule a endocrinology consultation ASAP, to start metformin 500 mg daily for 3 days the increase to bid day 4 if tolerated, to check his glucose bid and to call me early next week with readings and if unable to see the endocrinologist within the next 2 weeks

## 2020-11-27 NOTE — HISTORY OF PRESENT ILLNESS
[FreeTextEntry1] : patient presents to follow up for glucose test  [de-identified] : He is still on maintenance chemotherapy.  He was at [facility] recently and his blood sugar was in the 200 range and he was given insulin.  Earlier this week he had chemotherapy and didn't feel well.  His blood sugar was 411.  He has been urinating often and has lost weight and is very thirsty.  He feels fatigued.  He receives steroid with his chemotherapy every 2 weeks.

## 2020-12-07 ENCOUNTER — RX RENEWAL (OUTPATIENT)
Age: 55
End: 2020-12-07

## 2021-01-01 ENCOUNTER — RESULT REVIEW (OUTPATIENT)
Age: 56
End: 2021-01-01

## 2021-01-01 ENCOUNTER — RX RENEWAL (OUTPATIENT)
Age: 56
End: 2021-01-01

## 2021-01-01 ENCOUNTER — APPOINTMENT (OUTPATIENT)
Dept: COLORECTAL SURGERY | Facility: HOSPITAL | Age: 56
End: 2021-01-01
Payer: COMMERCIAL

## 2021-01-01 ENCOUNTER — APPOINTMENT (OUTPATIENT)
Dept: RADIATION ONCOLOGY | Facility: CLINIC | Age: 56
End: 2021-01-01
Payer: COMMERCIAL

## 2021-01-01 ENCOUNTER — APPOINTMENT (OUTPATIENT)
Dept: GASTROENTEROLOGY | Facility: HOSPITAL | Age: 56
End: 2021-01-01
Payer: COMMERCIAL

## 2021-01-01 ENCOUNTER — APPOINTMENT (OUTPATIENT)
Dept: FAMILY MEDICINE | Facility: CLINIC | Age: 56
End: 2021-01-01

## 2021-01-01 ENCOUNTER — OUTPATIENT (OUTPATIENT)
Dept: OUTPATIENT SERVICES | Facility: HOSPITAL | Age: 56
LOS: 1 days | Discharge: HOME | End: 2021-01-01

## 2021-01-01 ENCOUNTER — APPOINTMENT (OUTPATIENT)
Dept: DISASTER EMERGENCY | Facility: CLINIC | Age: 56
End: 2021-01-01

## 2021-01-01 ENCOUNTER — APPOINTMENT (OUTPATIENT)
Dept: COLORECTAL SURGERY | Facility: CLINIC | Age: 56
End: 2021-01-01
Payer: COMMERCIAL

## 2021-01-01 ENCOUNTER — APPOINTMENT (OUTPATIENT)
Dept: HEMATOLOGY ONCOLOGY | Facility: CLINIC | Age: 56
End: 2021-01-01
Payer: COMMERCIAL

## 2021-01-01 ENCOUNTER — TRANSCRIPTION ENCOUNTER (OUTPATIENT)
Age: 56
End: 2021-01-01

## 2021-01-01 ENCOUNTER — NON-APPOINTMENT (OUTPATIENT)
Age: 56
End: 2021-01-01

## 2021-01-01 ENCOUNTER — APPOINTMENT (OUTPATIENT)
Dept: FAMILY MEDICINE | Facility: CLINIC | Age: 56
End: 2021-01-01
Payer: COMMERCIAL

## 2021-01-01 ENCOUNTER — INPATIENT (INPATIENT)
Facility: HOSPITAL | Age: 56
LOS: 2 days | Discharge: HOME HEALTH | DRG: 330 | End: 2021-08-20
Attending: SURGERY | Admitting: COLON & RECTAL SURGERY
Payer: COMMERCIAL

## 2021-01-01 ENCOUNTER — APPOINTMENT (OUTPATIENT)
Dept: GASTROENTEROLOGY | Facility: CLINIC | Age: 56
End: 2021-01-01
Payer: COMMERCIAL

## 2021-01-01 VITALS
BODY MASS INDEX: 28.49 KG/M2 | WEIGHT: 188 LBS | DIASTOLIC BLOOD PRESSURE: 70 MMHG | HEIGHT: 68 IN | RESPIRATION RATE: 16 BRPM | HEART RATE: 68 BPM | OXYGEN SATURATION: 99 % | SYSTOLIC BLOOD PRESSURE: 110 MMHG

## 2021-01-01 VITALS
DIASTOLIC BLOOD PRESSURE: 80 MMHG | BODY MASS INDEX: 32.58 KG/M2 | HEART RATE: 69 BPM | OXYGEN SATURATION: 98 % | DIASTOLIC BLOOD PRESSURE: 70 MMHG | SYSTOLIC BLOOD PRESSURE: 120 MMHG | HEIGHT: 68 IN | SYSTOLIC BLOOD PRESSURE: 122 MMHG | WEIGHT: 215 LBS | RESPIRATION RATE: 12 BRPM

## 2021-01-01 VITALS
HEIGHT: 68 IN | DIASTOLIC BLOOD PRESSURE: 88 MMHG | HEART RATE: 87 BPM | SYSTOLIC BLOOD PRESSURE: 129 MMHG | BODY MASS INDEX: 31.83 KG/M2 | WEIGHT: 210 LBS

## 2021-01-01 VITALS — DIASTOLIC BLOOD PRESSURE: 76 MMHG | SYSTOLIC BLOOD PRESSURE: 112 MMHG

## 2021-01-01 VITALS
HEART RATE: 66 BPM | SYSTOLIC BLOOD PRESSURE: 132 MMHG | HEIGHT: 68.5 IN | DIASTOLIC BLOOD PRESSURE: 86 MMHG | BODY MASS INDEX: 28.35 KG/M2 | WEIGHT: 189.25 LBS | RESPIRATION RATE: 16 BRPM

## 2021-01-01 VITALS
DIASTOLIC BLOOD PRESSURE: 75 MMHG | HEART RATE: 78 BPM | OXYGEN SATURATION: 100 % | TEMPERATURE: 97 F | HEIGHT: 68 IN | WEIGHT: 199.96 LBS | SYSTOLIC BLOOD PRESSURE: 128 MMHG | RESPIRATION RATE: 16 BRPM

## 2021-01-01 VITALS
WEIGHT: 200 LBS | TEMPERATURE: 98 F | RESPIRATION RATE: 14 BRPM | HEIGHT: 68 IN | BODY MASS INDEX: 30.31 KG/M2 | OXYGEN SATURATION: 99 % | SYSTOLIC BLOOD PRESSURE: 122 MMHG | HEART RATE: 89 BPM | DIASTOLIC BLOOD PRESSURE: 79 MMHG

## 2021-01-01 VITALS
HEIGHT: 68.5 IN | HEART RATE: 72 BPM | WEIGHT: 188.8 LBS | DIASTOLIC BLOOD PRESSURE: 99 MMHG | BODY MASS INDEX: 28.29 KG/M2 | SYSTOLIC BLOOD PRESSURE: 146 MMHG | RESPIRATION RATE: 18 BRPM | OXYGEN SATURATION: 98 %

## 2021-01-01 VITALS
RESPIRATION RATE: 18 BRPM | SYSTOLIC BLOOD PRESSURE: 127 MMHG | HEART RATE: 81 BPM | DIASTOLIC BLOOD PRESSURE: 80 MMHG | TEMPERATURE: 99 F | OXYGEN SATURATION: 100 %

## 2021-01-01 VITALS — WEIGHT: 200 LBS | BODY MASS INDEX: 30.31 KG/M2 | TEMPERATURE: 98.7 F | HEIGHT: 68 IN | RESPIRATION RATE: 16 BRPM

## 2021-01-01 VITALS — TEMPERATURE: 97.3 F

## 2021-01-01 VITALS — TEMPERATURE: 98.1 F

## 2021-01-01 VITALS
OXYGEN SATURATION: 96 % | WEIGHT: 188 LBS | BODY MASS INDEX: 28.17 KG/M2 | RESPIRATION RATE: 18 BRPM | HEART RATE: 77 BPM | DIASTOLIC BLOOD PRESSURE: 90 MMHG | TEMPERATURE: 96.5 F | SYSTOLIC BLOOD PRESSURE: 124 MMHG | HEIGHT: 68.5 IN

## 2021-01-01 VITALS
TEMPERATURE: 97.6 F | WEIGHT: 191 LBS | HEART RATE: 77 BPM | SYSTOLIC BLOOD PRESSURE: 126 MMHG | DIASTOLIC BLOOD PRESSURE: 84 MMHG | BODY MASS INDEX: 29.04 KG/M2

## 2021-01-01 VITALS
HEIGHT: 68 IN | WEIGHT: 188 LBS | RESPIRATION RATE: 18 BRPM | HEART RATE: 76 BPM | DIASTOLIC BLOOD PRESSURE: 100 MMHG | SYSTOLIC BLOOD PRESSURE: 148 MMHG | BODY MASS INDEX: 28.49 KG/M2

## 2021-01-01 VITALS
TEMPERATURE: 98 F | SYSTOLIC BLOOD PRESSURE: 120 MMHG | BODY MASS INDEX: 28.43 KG/M2 | HEART RATE: 68 BPM | DIASTOLIC BLOOD PRESSURE: 77 MMHG | WEIGHT: 187 LBS

## 2021-01-01 VITALS
WEIGHT: 188 LBS | HEART RATE: 74 BPM | BODY MASS INDEX: 28.49 KG/M2 | DIASTOLIC BLOOD PRESSURE: 85 MMHG | HEIGHT: 68 IN | RESPIRATION RATE: 16 BRPM | SYSTOLIC BLOOD PRESSURE: 120 MMHG

## 2021-01-01 VITALS — DIASTOLIC BLOOD PRESSURE: 76 MMHG | HEART RATE: 72 BPM | SYSTOLIC BLOOD PRESSURE: 120 MMHG

## 2021-01-01 DIAGNOSIS — R09.81 NASAL CONGESTION: ICD-10-CM

## 2021-01-01 DIAGNOSIS — C79.9 SECONDARY MALIGNANT NEOPLASM OF UNSPECIFIED SITE: ICD-10-CM

## 2021-01-01 DIAGNOSIS — C18.9 MALIGNANT NEOPLASM OF COLON, UNSPECIFIED: ICD-10-CM

## 2021-01-01 DIAGNOSIS — M25.50 PAIN IN UNSPECIFIED JOINT: ICD-10-CM

## 2021-01-01 DIAGNOSIS — Z98.890 OTHER SPECIFIED POSTPROCEDURAL STATES: Chronic | ICD-10-CM

## 2021-01-01 DIAGNOSIS — Z93.3 COLOSTOMY STATUS: ICD-10-CM

## 2021-01-01 DIAGNOSIS — C20 MALIGNANT NEOPLASM OF RECTUM: ICD-10-CM

## 2021-01-01 DIAGNOSIS — J32.9 CHRONIC SINUSITIS, UNSPECIFIED: ICD-10-CM

## 2021-01-01 DIAGNOSIS — K59.09 OTHER CONSTIPATION: ICD-10-CM

## 2021-01-01 DIAGNOSIS — G47.30 SLEEP APNEA, UNSPECIFIED: ICD-10-CM

## 2021-01-01 DIAGNOSIS — C78.00 SECONDARY MALIGNANT NEOPLASM OF UNSPECIFIED LUNG: ICD-10-CM

## 2021-01-01 DIAGNOSIS — Z01.818 ENCOUNTER FOR OTHER PREPROCEDURAL EXAMINATION: ICD-10-CM

## 2021-01-01 DIAGNOSIS — E11.9 TYPE 2 DIABETES MELLITUS WITHOUT COMPLICATIONS: ICD-10-CM

## 2021-01-01 DIAGNOSIS — C19 MALIGNANT NEOPLASM OF RECTOSIGMOID JUNCTION: ICD-10-CM

## 2021-01-01 DIAGNOSIS — I10 ESSENTIAL (PRIMARY) HYPERTENSION: ICD-10-CM

## 2021-01-01 DIAGNOSIS — C18.7 MALIGNANT NEOPLASM OF SIGMOID COLON: ICD-10-CM

## 2021-01-01 DIAGNOSIS — C78.7 SECONDARY MALIGNANT NEOPLASM OF LIVER AND INTRAHEPATIC BILE DUCT: ICD-10-CM

## 2021-01-01 LAB
A1C WITH ESTIMATED AVERAGE GLUCOSE RESULT: 5.2 % — SIGNIFICANT CHANGE UP (ref 4–5.6)
ABO RH CONFIRMATION: SIGNIFICANT CHANGE UP
ALBUMIN SERPL ELPH-MCNC: 3.5 G/DL — SIGNIFICANT CHANGE UP (ref 3.3–5)
ALBUMIN SERPL ELPH-MCNC: 3.9 G/DL
ALBUMIN SERPL ELPH-MCNC: 4.2 G/DL — SIGNIFICANT CHANGE UP (ref 3.3–5)
ALBUMIN SERPL ELPH-MCNC: 4.4 G/DL — SIGNIFICANT CHANGE UP (ref 3.3–5)
ALP BLD-CCNC: 169 U/L
ALP SERPL-CCNC: 136 U/L — HIGH (ref 40–120)
ALP SERPL-CCNC: 227 U/L — HIGH (ref 40–120)
ALP SERPL-CCNC: 268 U/L — HIGH (ref 40–120)
ALT FLD-CCNC: 119 U/L — HIGH (ref 10–45)
ALT FLD-CCNC: 26 U/L — SIGNIFICANT CHANGE UP (ref 10–45)
ALT FLD-CCNC: 47 U/L — SIGNIFICANT CHANGE UP (ref 12–78)
ALT SERPL-CCNC: 23 U/L
ANION GAP SERPL CALC-SCNC: 11 MMOL/L
ANION GAP SERPL CALC-SCNC: 12 MMOL/L — SIGNIFICANT CHANGE UP (ref 5–17)
ANION GAP SERPL CALC-SCNC: 15 MMOL/L — SIGNIFICANT CHANGE UP (ref 5–17)
ANION GAP SERPL CALC-SCNC: 3 MMOL/L — LOW (ref 5–17)
ANION GAP SERPL CALC-SCNC: 4 MMOL/L — LOW (ref 5–17)
ANION GAP SERPL CALC-SCNC: 5 MMOL/L — SIGNIFICANT CHANGE UP (ref 5–17)
APTT BLD: 30.2 SEC — SIGNIFICANT CHANGE UP (ref 27.5–35.5)
AST SERPL-CCNC: 35 U/L — SIGNIFICANT CHANGE UP (ref 10–40)
AST SERPL-CCNC: 39 U/L
AST SERPL-CCNC: 40 U/L — HIGH (ref 15–37)
AST SERPL-CCNC: 78 U/L — HIGH (ref 10–40)
BASOPHILS # BLD AUTO: 0.02 K/UL — SIGNIFICANT CHANGE UP (ref 0–0.2)
BASOPHILS # BLD AUTO: 0.04 K/UL — SIGNIFICANT CHANGE UP (ref 0–0.2)
BASOPHILS NFR BLD AUTO: 0.6 % — SIGNIFICANT CHANGE UP (ref 0–2)
BASOPHILS NFR BLD AUTO: 0.7 % — SIGNIFICANT CHANGE UP (ref 0–2)
BILIRUB SERPL-MCNC: 0.4 MG/DL — SIGNIFICANT CHANGE UP (ref 0.2–1.2)
BILIRUB SERPL-MCNC: 0.7 MG/DL — SIGNIFICANT CHANGE UP (ref 0.2–1.2)
BILIRUB SERPL-MCNC: 0.8 MG/DL — SIGNIFICANT CHANGE UP (ref 0.2–1.2)
BILIRUB SERPL-MCNC: 1.5 MG/DL
BUN SERPL-MCNC: 11 MG/DL — SIGNIFICANT CHANGE UP (ref 7–23)
BUN SERPL-MCNC: 12 MG/DL — SIGNIFICANT CHANGE UP (ref 7–23)
BUN SERPL-MCNC: 14 MG/DL
BUN SERPL-MCNC: 14 MG/DL — SIGNIFICANT CHANGE UP (ref 7–23)
BUN SERPL-MCNC: 14 MG/DL — SIGNIFICANT CHANGE UP (ref 7–23)
BUN SERPL-MCNC: 15 MG/DL — SIGNIFICANT CHANGE UP (ref 7–23)
CALCIUM SERPL-MCNC: 8.7 MG/DL — SIGNIFICANT CHANGE UP (ref 8.5–10.1)
CALCIUM SERPL-MCNC: 8.7 MG/DL — SIGNIFICANT CHANGE UP (ref 8.5–10.1)
CALCIUM SERPL-MCNC: 8.8 MG/DL
CALCIUM SERPL-MCNC: 9.1 MG/DL — SIGNIFICANT CHANGE UP (ref 8.5–10.1)
CALCIUM SERPL-MCNC: 9.2 MG/DL — SIGNIFICANT CHANGE UP (ref 8.4–10.5)
CALCIUM SERPL-MCNC: 9.4 MG/DL — SIGNIFICANT CHANGE UP (ref 8.4–10.5)
CEA SERPL-MCNC: 523 NG/ML — HIGH (ref 0–3.8)
CEA SERPL-MCNC: 671 NG/ML
CHLORIDE SERPL-SCNC: 102 MMOL/L — SIGNIFICANT CHANGE UP (ref 96–108)
CHLORIDE SERPL-SCNC: 103 MMOL/L
CHLORIDE SERPL-SCNC: 104 MMOL/L — SIGNIFICANT CHANGE UP (ref 96–108)
CHLORIDE SERPL-SCNC: 105 MMOL/L — SIGNIFICANT CHANGE UP (ref 96–108)
CHLORIDE SERPL-SCNC: 106 MMOL/L — SIGNIFICANT CHANGE UP (ref 96–108)
CHLORIDE SERPL-SCNC: 98 MMOL/L — SIGNIFICANT CHANGE UP (ref 96–108)
CO2 SERPL-SCNC: 23 MMOL/L — SIGNIFICANT CHANGE UP (ref 22–31)
CO2 SERPL-SCNC: 27 MMOL/L
CO2 SERPL-SCNC: 27 MMOL/L — SIGNIFICANT CHANGE UP (ref 22–31)
CO2 SERPL-SCNC: 28 MMOL/L — SIGNIFICANT CHANGE UP (ref 22–31)
CO2 SERPL-SCNC: 29 MMOL/L — SIGNIFICANT CHANGE UP (ref 22–31)
CO2 SERPL-SCNC: 29 MMOL/L — SIGNIFICANT CHANGE UP (ref 22–31)
CREAT SERPL-MCNC: 0.85 MG/DL — SIGNIFICANT CHANGE UP (ref 0.5–1.3)
CREAT SERPL-MCNC: 0.91 MG/DL
CREAT SERPL-MCNC: 0.97 MG/DL — SIGNIFICANT CHANGE UP (ref 0.5–1.3)
CREAT SERPL-MCNC: 1 MG/DL — SIGNIFICANT CHANGE UP (ref 0.5–1.3)
CREAT SERPL-MCNC: 1 MG/DL — SIGNIFICANT CHANGE UP (ref 0.5–1.3)
CREAT SERPL-MCNC: 1.01 MG/DL — SIGNIFICANT CHANGE UP (ref 0.5–1.3)
CREAT SPEC-SCNC: 88
EOSINOPHIL # BLD AUTO: 0.14 K/UL — SIGNIFICANT CHANGE UP (ref 0–0.5)
EOSINOPHIL # BLD AUTO: 0.18 K/UL — SIGNIFICANT CHANGE UP (ref 0–0.5)
EOSINOPHIL NFR BLD AUTO: 2.4 % — SIGNIFICANT CHANGE UP (ref 0–6)
EOSINOPHIL NFR BLD AUTO: 5.6 % — SIGNIFICANT CHANGE UP (ref 0–6)
ESTIMATED AVERAGE GLUCOSE: 103 MG/DL — SIGNIFICANT CHANGE UP (ref 68–114)
GLUCOSE BLDC GLUCOMTR-MCNC: 100 MG/DL — HIGH (ref 70–99)
GLUCOSE BLDC GLUCOMTR-MCNC: 104 MG/DL — HIGH (ref 70–99)
GLUCOSE BLDC GLUCOMTR-MCNC: 108 MG/DL — HIGH (ref 70–99)
GLUCOSE BLDC GLUCOMTR-MCNC: 108 MG/DL — HIGH (ref 70–99)
GLUCOSE BLDC GLUCOMTR-MCNC: 118 MG/DL — HIGH (ref 70–99)
GLUCOSE BLDC GLUCOMTR-MCNC: 82 MG/DL — SIGNIFICANT CHANGE UP (ref 70–99)
GLUCOSE BLDC GLUCOMTR-MCNC: 90 MG/DL — SIGNIFICANT CHANGE UP (ref 70–99)
GLUCOSE BLDC GLUCOMTR-MCNC: 94 MG/DL — SIGNIFICANT CHANGE UP (ref 70–99)
GLUCOSE BLDC GLUCOMTR-MCNC: 98 MG/DL — SIGNIFICANT CHANGE UP (ref 70–99)
GLUCOSE SERPL-MCNC: 100 MG/DL
GLUCOSE SERPL-MCNC: 102 MG/DL — HIGH (ref 70–99)
GLUCOSE SERPL-MCNC: 109 MG/DL — HIGH (ref 70–99)
GLUCOSE SERPL-MCNC: 113 MG/DL — HIGH (ref 70–99)
GLUCOSE SERPL-MCNC: 115 MG/DL — HIGH (ref 70–99)
GLUCOSE SERPL-MCNC: 117 MG/DL — HIGH (ref 70–99)
HAV IGM SER-ACNC: SIGNIFICANT CHANGE UP
HBA1C MFR BLD HPLC: 5.2
HBV CORE IGM SER-ACNC: SIGNIFICANT CHANGE UP
HBV SURFACE AG SER-ACNC: SIGNIFICANT CHANGE UP
HCT VFR BLD CALC: 37.8 % — LOW (ref 39–50)
HCT VFR BLD CALC: 38.3 % — LOW (ref 39–50)
HCT VFR BLD CALC: 38.5 % — LOW (ref 39–50)
HCT VFR BLD CALC: 39.3 % — SIGNIFICANT CHANGE UP (ref 39–50)
HCT VFR BLD CALC: 39.7 % — SIGNIFICANT CHANGE UP (ref 39–50)
HCV AB S/CO SERPL IA: 0.12 S/CO — SIGNIFICANT CHANGE UP (ref 0–0.99)
HCV AB SERPL-IMP: SIGNIFICANT CHANGE UP
HGB BLD-MCNC: 12.9 G/DL — LOW (ref 13–17)
HGB BLD-MCNC: 13.1 G/DL — SIGNIFICANT CHANGE UP (ref 13–17)
HGB BLD-MCNC: 13.8 G/DL — SIGNIFICANT CHANGE UP (ref 13–17)
IMM GRANULOCYTES NFR BLD AUTO: 0.3 % — SIGNIFICANT CHANGE UP (ref 0–1.5)
IMM GRANULOCYTES NFR BLD AUTO: 0.3 % — SIGNIFICANT CHANGE UP (ref 0–1.5)
INR BLD: 1.08 RATIO — SIGNIFICANT CHANGE UP (ref 0.88–1.16)
INR BLD: 1.18 RATIO — HIGH (ref 0.88–1.16)
LYMPHOCYTES # BLD AUTO: 0.57 K/UL — LOW (ref 1–3.3)
LYMPHOCYTES # BLD AUTO: 0.76 K/UL — LOW (ref 1–3.3)
LYMPHOCYTES # BLD AUTO: 13 % — SIGNIFICANT CHANGE UP (ref 13–44)
LYMPHOCYTES # BLD AUTO: 17.9 % — SIGNIFICANT CHANGE UP (ref 13–44)
MAGNESIUM SERPL-MCNC: 1.8 MG/DL
MAGNESIUM SERPL-MCNC: 2.1 MG/DL — SIGNIFICANT CHANGE UP (ref 1.6–2.6)
MAGNESIUM SERPL-MCNC: 2.2 MG/DL — SIGNIFICANT CHANGE UP (ref 1.6–2.6)
MAGNESIUM SERPL-MCNC: 2.3 MG/DL — SIGNIFICANT CHANGE UP (ref 1.6–2.6)
MAGNESIUM SERPL-MCNC: 2.3 MG/DL — SIGNIFICANT CHANGE UP (ref 1.6–2.6)
MAGNESIUM SERPL-MCNC: 2.5 MG/DL — SIGNIFICANT CHANGE UP (ref 1.6–2.6)
MCHC RBC-ENTMCNC: 29.9 PG — SIGNIFICANT CHANGE UP (ref 27–34)
MCHC RBC-ENTMCNC: 30.6 PG — SIGNIFICANT CHANGE UP (ref 27–34)
MCHC RBC-ENTMCNC: 31.5 PG — SIGNIFICANT CHANGE UP (ref 27–34)
MCHC RBC-ENTMCNC: 31.6 PG — SIGNIFICANT CHANGE UP (ref 27–34)
MCHC RBC-ENTMCNC: 31.9 PG — SIGNIFICANT CHANGE UP (ref 27–34)
MCHC RBC-ENTMCNC: 32.8 GM/DL — SIGNIFICANT CHANGE UP (ref 32–36)
MCHC RBC-ENTMCNC: 33.7 GM/DL — SIGNIFICANT CHANGE UP (ref 32–36)
MCHC RBC-ENTMCNC: 34 GM/DL — SIGNIFICANT CHANGE UP (ref 32–36)
MCHC RBC-ENTMCNC: 34.1 GM/DL — SIGNIFICANT CHANGE UP (ref 32–36)
MCHC RBC-ENTMCNC: 34.8 GM/DL — SIGNIFICANT CHANGE UP (ref 32–36)
MCV RBC AUTO: 85.9 FL — SIGNIFICANT CHANGE UP (ref 80–100)
MCV RBC AUTO: 93 FL — SIGNIFICANT CHANGE UP (ref 80–100)
MCV RBC AUTO: 93.1 FL — SIGNIFICANT CHANGE UP (ref 80–100)
MCV RBC AUTO: 93.6 FL — SIGNIFICANT CHANGE UP (ref 80–100)
MCV RBC AUTO: 93.6 FL — SIGNIFICANT CHANGE UP (ref 80–100)
MICROALBUMIN 24H UR DL<=1MG/L-MCNC: 42.1
MICROALBUMIN/CREATININE, URINE: 478
MONOCYTES # BLD AUTO: 0.36 K/UL — SIGNIFICANT CHANGE UP (ref 0–0.9)
MONOCYTES # BLD AUTO: 0.63 K/UL — SIGNIFICANT CHANGE UP (ref 0–0.9)
MONOCYTES NFR BLD AUTO: 10.8 % — SIGNIFICANT CHANGE UP (ref 2–14)
MONOCYTES NFR BLD AUTO: 11.3 % — SIGNIFICANT CHANGE UP (ref 2–14)
NEUTROPHILS # BLD AUTO: 2.05 K/UL — SIGNIFICANT CHANGE UP (ref 1.8–7.4)
NEUTROPHILS # BLD AUTO: 4.26 K/UL — SIGNIFICANT CHANGE UP (ref 1.8–7.4)
NEUTROPHILS NFR BLD AUTO: 64.3 % — SIGNIFICANT CHANGE UP (ref 43–77)
NEUTROPHILS NFR BLD AUTO: 72.8 % — SIGNIFICANT CHANGE UP (ref 43–77)
NRBC # BLD: 0 /100 WBCS — SIGNIFICANT CHANGE UP (ref 0–0)
NRBC # BLD: 0 /100 WBCS — SIGNIFICANT CHANGE UP (ref 0–0)
PHOSPHATE SERPL-MCNC: 3.2 MG/DL — SIGNIFICANT CHANGE UP (ref 2.5–4.5)
PHOSPHATE SERPL-MCNC: 3.6 MG/DL — SIGNIFICANT CHANGE UP (ref 2.5–4.5)
PHOSPHATE SERPL-MCNC: 3.8 MG/DL — SIGNIFICANT CHANGE UP (ref 2.5–4.5)
PLATELET # BLD AUTO: 111 K/UL — LOW (ref 150–400)
PLATELET # BLD AUTO: 132 K/UL — LOW (ref 150–400)
PLATELET # BLD AUTO: 78 K/UL — LOW (ref 150–400)
PLATELET # BLD AUTO: 92 K/UL — LOW (ref 150–400)
PLATELET # BLD AUTO: 97 K/UL — LOW (ref 150–400)
POTASSIUM SERPL-MCNC: 3.9 MMOL/L — SIGNIFICANT CHANGE UP (ref 3.5–5.3)
POTASSIUM SERPL-MCNC: 4.2 MMOL/L — SIGNIFICANT CHANGE UP (ref 3.5–5.3)
POTASSIUM SERPL-MCNC: 4.2 MMOL/L — SIGNIFICANT CHANGE UP (ref 3.5–5.3)
POTASSIUM SERPL-MCNC: 4.4 MMOL/L — SIGNIFICANT CHANGE UP (ref 3.5–5.3)
POTASSIUM SERPL-MCNC: 5 MMOL/L — SIGNIFICANT CHANGE UP (ref 3.5–5.3)
POTASSIUM SERPL-SCNC: 3.9 MMOL/L — SIGNIFICANT CHANGE UP (ref 3.5–5.3)
POTASSIUM SERPL-SCNC: 4.2 MMOL/L — SIGNIFICANT CHANGE UP (ref 3.5–5.3)
POTASSIUM SERPL-SCNC: 4.2 MMOL/L — SIGNIFICANT CHANGE UP (ref 3.5–5.3)
POTASSIUM SERPL-SCNC: 4.3 MMOL/L
POTASSIUM SERPL-SCNC: 4.4 MMOL/L — SIGNIFICANT CHANGE UP (ref 3.5–5.3)
POTASSIUM SERPL-SCNC: 5 MMOL/L — SIGNIFICANT CHANGE UP (ref 3.5–5.3)
PROT SERPL-MCNC: 6.9 G/DL
PROT SERPL-MCNC: 6.9 G/DL — SIGNIFICANT CHANGE UP (ref 6–8.3)
PROT SERPL-MCNC: 6.9 GM/DL — SIGNIFICANT CHANGE UP (ref 6–8.3)
PROT SERPL-MCNC: 7 G/DL — SIGNIFICANT CHANGE UP (ref 6–8.3)
PROTHROM AB SERPL-ACNC: 12.7 SEC — SIGNIFICANT CHANGE UP (ref 10.6–13.6)
PROTHROM AB SERPL-ACNC: 13.6 SEC — SIGNIFICANT CHANGE UP (ref 10.6–13.6)
RBC # BLD: 4.04 M/UL — LOW (ref 4.2–5.8)
RBC # BLD: 4.09 M/UL — LOW (ref 4.2–5.8)
RBC # BLD: 4.14 M/UL — LOW (ref 4.2–5.8)
RBC # BLD: 4.22 M/UL — SIGNIFICANT CHANGE UP (ref 4.2–5.8)
RBC # BLD: 4.62 M/UL — SIGNIFICANT CHANGE UP (ref 4.2–5.8)
RBC # FLD: 12.7 % — SIGNIFICANT CHANGE UP (ref 10.3–14.5)
RBC # FLD: 12.9 % — SIGNIFICANT CHANGE UP (ref 10.3–14.5)
RBC # FLD: 13.2 % — SIGNIFICANT CHANGE UP (ref 10.3–14.5)
RBC # FLD: 13.2 % — SIGNIFICANT CHANGE UP (ref 10.3–14.5)
RBC # FLD: 13.4 % — SIGNIFICANT CHANGE UP (ref 10.3–14.5)
SARS-COV-2 N GENE NPH QL NAA+PROBE: NOT DETECTED
SARS-COV-2 RNA SPEC QL NAA+PROBE: SIGNIFICANT CHANGE UP
SODIUM SERPL-SCNC: 136 MMOL/L — SIGNIFICANT CHANGE UP (ref 135–145)
SODIUM SERPL-SCNC: 137 MMOL/L — SIGNIFICANT CHANGE UP (ref 135–145)
SODIUM SERPL-SCNC: 138 MMOL/L — SIGNIFICANT CHANGE UP (ref 135–145)
SODIUM SERPL-SCNC: 139 MMOL/L — SIGNIFICANT CHANGE UP (ref 135–145)
SODIUM SERPL-SCNC: 139 MMOL/L — SIGNIFICANT CHANGE UP (ref 135–145)
SODIUM SERPL-SCNC: 141 MMOL/L
WBC # BLD: 3.19 K/UL — LOW (ref 3.8–10.5)
WBC # BLD: 5.18 K/UL — SIGNIFICANT CHANGE UP (ref 3.8–10.5)
WBC # BLD: 5.3 K/UL — SIGNIFICANT CHANGE UP (ref 3.8–10.5)
WBC # BLD: 5.85 K/UL — SIGNIFICANT CHANGE UP (ref 3.8–10.5)
WBC # BLD: 6.07 K/UL — SIGNIFICANT CHANGE UP (ref 3.8–10.5)
WBC # FLD AUTO: 3.19 K/UL — LOW (ref 3.8–10.5)
WBC # FLD AUTO: 5.18 K/UL — SIGNIFICANT CHANGE UP (ref 3.8–10.5)
WBC # FLD AUTO: 5.3 K/UL — SIGNIFICANT CHANGE UP (ref 3.8–10.5)
WBC # FLD AUTO: 5.85 K/UL — SIGNIFICANT CHANGE UP (ref 3.8–10.5)
WBC # FLD AUTO: 6.07 K/UL — SIGNIFICANT CHANGE UP (ref 3.8–10.5)

## 2021-01-01 PROCEDURE — 83735 ASSAY OF MAGNESIUM: CPT

## 2021-01-01 PROCEDURE — 44188 LAP COLOSTOMY: CPT | Mod: AS

## 2021-01-01 PROCEDURE — G6012: CPT

## 2021-01-01 PROCEDURE — 86901 BLOOD TYPING SEROLOGIC RH(D): CPT

## 2021-01-01 PROCEDURE — 77334 RADIATION TREATMENT AID(S): CPT

## 2021-01-01 PROCEDURE — 99215 OFFICE O/P EST HI 40 MIN: CPT

## 2021-01-01 PROCEDURE — 36415 COLL VENOUS BLD VENIPUNCTURE: CPT

## 2021-01-01 PROCEDURE — 44188 LAP COLOSTOMY: CPT

## 2021-01-01 PROCEDURE — 99203 OFFICE O/P NEW LOW 30 MIN: CPT

## 2021-01-01 PROCEDURE — 77336 RADIATION PHYSICS CONSULT: CPT

## 2021-01-01 PROCEDURE — 77263 THER RADIOLOGY TX PLNG CPLX: CPT

## 2021-01-01 PROCEDURE — 99205 OFFICE O/P NEW HI 60 MIN: CPT

## 2021-01-01 PROCEDURE — 77295 3-D RADIOTHERAPY PLAN: CPT

## 2021-01-01 PROCEDURE — U0003: CPT

## 2021-01-01 PROCEDURE — 77300 RADIATION THERAPY DOSE PLAN: CPT

## 2021-01-01 PROCEDURE — 99024 POSTOP FOLLOW-UP VISIT: CPT

## 2021-01-01 PROCEDURE — 80048 BASIC METABOLIC PNL TOTAL CA: CPT

## 2021-01-01 PROCEDURE — 85730 THROMBOPLASTIN TIME PARTIAL: CPT

## 2021-01-01 PROCEDURE — 80053 COMPREHEN METABOLIC PANEL: CPT

## 2021-01-01 PROCEDURE — 45335 SIGMOIDOSCOPY W/SUBMUC INJ: CPT

## 2021-01-01 PROCEDURE — 77332 RADIATION TREATMENT AID(S): CPT

## 2021-01-01 PROCEDURE — 99072 ADDL SUPL MATRL&STAF TM PHE: CPT

## 2021-01-01 PROCEDURE — 99213 OFFICE O/P EST LOW 20 MIN: CPT

## 2021-01-01 PROCEDURE — 85610 PROTHROMBIN TIME: CPT

## 2021-01-01 PROCEDURE — 82962 GLUCOSE BLOOD TEST: CPT

## 2021-01-01 PROCEDURE — 77290 THER RAD SIMULAJ FIELD CPLX: CPT

## 2021-01-01 PROCEDURE — 86850 RBC ANTIBODY SCREEN: CPT

## 2021-01-01 PROCEDURE — 85027 COMPLETE CBC AUTOMATED: CPT

## 2021-01-01 PROCEDURE — C1889: CPT

## 2021-01-01 PROCEDURE — 99214 OFFICE O/P EST MOD 30 MIN: CPT

## 2021-01-01 PROCEDURE — 93010 ELECTROCARDIOGRAM REPORT: CPT

## 2021-01-01 PROCEDURE — 83036 HEMOGLOBIN GLYCOSYLATED A1C: CPT

## 2021-01-01 PROCEDURE — 99204 OFFICE O/P NEW MOD 45 MIN: CPT | Mod: 25

## 2021-01-01 PROCEDURE — U0005: CPT

## 2021-01-01 PROCEDURE — 84100 ASSAY OF PHOSPHORUS: CPT

## 2021-01-01 PROCEDURE — 86900 BLOOD TYPING SEROLOGIC ABO: CPT

## 2021-01-01 PROCEDURE — 93005 ELECTROCARDIOGRAM TRACING: CPT

## 2021-01-01 PROCEDURE — 77427 RADIATION TX MANAGEMENT X5: CPT

## 2021-01-01 PROCEDURE — 71045 X-RAY EXAM CHEST 1 VIEW: CPT | Mod: 26

## 2021-01-01 RX ORDER — PROCHLORPERAZINE MALEATE 10 MG/1
10 TABLET ORAL
Qty: 60 | Refills: 0 | Status: DISCONTINUED | COMMUNITY
Start: 2019-07-19 | End: 2021-01-01

## 2021-01-01 RX ORDER — DICYCLOMINE HYDROCHLORIDE 10 MG/1
10 CAPSULE ORAL
Qty: 30 | Refills: 0 | Status: DISCONTINUED | COMMUNITY
Start: 2020-09-11 | End: 2021-01-01

## 2021-01-01 RX ORDER — ACYCLOVIR SODIUM 500 MG
0 VIAL (EA) INTRAVENOUS
Qty: 0 | Refills: 0 | DISCHARGE

## 2021-01-01 RX ORDER — SODIUM CHLORIDE 9 MG/ML
1000 INJECTION, SOLUTION INTRAVENOUS
Refills: 0 | Status: DISCONTINUED | OUTPATIENT
Start: 2021-01-01 | End: 2021-01-01

## 2021-01-01 RX ORDER — AMLODIPINE BESYLATE 5 MG/1
5 TABLET ORAL
Qty: 30 | Refills: 5 | Status: DISCONTINUED | COMMUNITY
Start: 2019-11-16 | End: 2021-01-01

## 2021-01-01 RX ORDER — METRONIDAZOLE 500 MG
500 TABLET ORAL ONCE
Refills: 0 | Status: COMPLETED | OUTPATIENT
Start: 2021-01-01 | End: 2021-01-01

## 2021-01-01 RX ORDER — ACETAMINOPHEN 500 MG
650 TABLET ORAL EVERY 6 HOURS
Refills: 0 | Status: DISCONTINUED | OUTPATIENT
Start: 2021-01-01 | End: 2021-01-01

## 2021-01-01 RX ORDER — AMLODIPINE BESYLATE 10 MG/1
10 TABLET ORAL
Qty: 90 | Refills: 3 | Status: ACTIVE | COMMUNITY
Start: 2021-01-01 | End: 1900-01-01

## 2021-01-01 RX ORDER — ONDANSETRON 8 MG/1
4 TABLET, FILM COATED ORAL EVERY 6 HOURS
Refills: 0 | Status: DISCONTINUED | OUTPATIENT
Start: 2021-01-01 | End: 2021-01-01

## 2021-01-01 RX ORDER — FENTANYL CITRATE 50 UG/ML
50 INJECTION INTRAVENOUS
Refills: 0 | Status: DISCONTINUED | OUTPATIENT
Start: 2021-01-01 | End: 2021-01-01

## 2021-01-01 RX ORDER — HEPARIN SODIUM 5000 [USP'U]/ML
5000 INJECTION INTRAVENOUS; SUBCUTANEOUS EVERY 8 HOURS
Refills: 0 | Status: DISCONTINUED | OUTPATIENT
Start: 2021-01-01 | End: 2021-01-01

## 2021-01-01 RX ORDER — LOSARTAN POTASSIUM 100 MG/1
50 TABLET, FILM COATED ORAL DAILY
Refills: 0 | Status: DISCONTINUED | OUTPATIENT
Start: 2021-01-01 | End: 2021-01-01

## 2021-01-01 RX ORDER — DEXTROSE 50 % IN WATER 50 %
12.5 SYRINGE (ML) INTRAVENOUS ONCE
Refills: 0 | Status: DISCONTINUED | OUTPATIENT
Start: 2021-01-01 | End: 2021-01-01

## 2021-01-01 RX ORDER — METOPROLOL SUCCINATE 50 MG/1
50 TABLET, EXTENDED RELEASE ORAL DAILY
Qty: 135 | Refills: 3 | Status: ACTIVE | COMMUNITY
Start: 2017-09-30 | End: 1900-01-01

## 2021-01-01 RX ORDER — NEOMYCIN SULFATE 500 MG/1
1000 TABLET ORAL ONCE
Refills: 0 | Status: COMPLETED | OUTPATIENT
Start: 2021-01-01 | End: 2021-01-01

## 2021-01-01 RX ORDER — GLUCAGON INJECTION, SOLUTION 0.5 MG/.1ML
1 INJECTION, SOLUTION SUBCUTANEOUS ONCE
Refills: 0 | Status: DISCONTINUED | OUTPATIENT
Start: 2021-01-01 | End: 2021-01-01

## 2021-01-01 RX ORDER — DEXTROSE 50 % IN WATER 50 %
15 SYRINGE (ML) INTRAVENOUS ONCE
Refills: 0 | Status: DISCONTINUED | OUTPATIENT
Start: 2021-01-01 | End: 2021-01-01

## 2021-01-01 RX ORDER — ONDANSETRON 8 MG/1
8 TABLET ORAL
Qty: 45 | Refills: 0 | Status: DISCONTINUED | COMMUNITY
Start: 2019-07-19 | End: 2021-01-01

## 2021-01-01 RX ORDER — ONDANSETRON 8 MG/1
8 TABLET ORAL
Qty: 30 | Refills: 1 | Status: ACTIVE | COMMUNITY
Start: 2021-01-01 | End: 1900-01-01

## 2021-01-01 RX ORDER — OXYCODONE HYDROCHLORIDE 5 MG/1
10 TABLET ORAL EVERY 4 HOURS
Refills: 0 | Status: DISCONTINUED | OUTPATIENT
Start: 2021-01-01 | End: 2021-01-01

## 2021-01-01 RX ORDER — LOSARTAN POTASSIUM 50 MG/1
50 TABLET, FILM COATED ORAL
Qty: 30 | Refills: 5 | Status: DISCONTINUED | COMMUNITY
Start: 2019-12-28 | End: 2021-01-01

## 2021-01-01 RX ORDER — ACETAMINOPHEN 500 MG
1000 TABLET ORAL EVERY 6 HOURS
Refills: 0 | Status: COMPLETED | OUTPATIENT
Start: 2021-01-01 | End: 2021-01-01

## 2021-01-01 RX ORDER — OXYCODONE HYDROCHLORIDE 5 MG/1
5 TABLET ORAL ONCE
Refills: 0 | Status: DISCONTINUED | OUTPATIENT
Start: 2021-01-01 | End: 2021-01-01

## 2021-01-01 RX ORDER — AMLODIPINE BESYLATE 2.5 MG/1
5 TABLET ORAL DAILY
Refills: 0 | Status: DISCONTINUED | OUTPATIENT
Start: 2021-01-01 | End: 2021-01-01

## 2021-01-01 RX ORDER — DEXTROSE 50 % IN WATER 50 %
25 SYRINGE (ML) INTRAVENOUS ONCE
Refills: 0 | Status: DISCONTINUED | OUTPATIENT
Start: 2021-01-01 | End: 2021-01-01

## 2021-01-01 RX ORDER — KETOROLAC TROMETHAMINE 30 MG/ML
15 SYRINGE (ML) INJECTION ONCE
Refills: 0 | Status: DISCONTINUED | OUTPATIENT
Start: 2021-01-01 | End: 2021-01-01

## 2021-01-01 RX ORDER — FLUTICASONE PROPIONATE 50 MCG
1 SPRAY, SUSPENSION NASAL DAILY
Refills: 0 | Status: DISCONTINUED | OUTPATIENT
Start: 2021-01-01 | End: 2021-01-01

## 2021-01-01 RX ORDER — ONDANSETRON 8 MG/1
4 TABLET, FILM COATED ORAL ONCE
Refills: 0 | Status: DISCONTINUED | OUTPATIENT
Start: 2021-01-01 | End: 2021-01-01

## 2021-01-01 RX ORDER — INSULIN GLARGINE 300 U/ML
INJECTION, SOLUTION SUBCUTANEOUS DAILY
Refills: 0 | Status: DISCONTINUED | COMMUNITY
End: 2021-01-01

## 2021-01-01 RX ORDER — DEXTROSE MONOHYDRATE, SODIUM CHLORIDE, AND POTASSIUM CHLORIDE 50; .745; 4.5 G/1000ML; G/1000ML; G/1000ML
1000 INJECTION, SOLUTION INTRAVENOUS
Refills: 0 | Status: DISCONTINUED | OUTPATIENT
Start: 2021-01-01 | End: 2021-01-01

## 2021-01-01 RX ORDER — INSULIN LISPRO 100/ML
VIAL (ML) SUBCUTANEOUS
Refills: 0 | Status: DISCONTINUED | OUTPATIENT
Start: 2021-01-01 | End: 2021-01-01

## 2021-01-01 RX ORDER — METFORMIN HYDROCHLORIDE 500 MG/1
500 TABLET, COATED ORAL
Qty: 60 | Refills: 3 | Status: DISCONTINUED | COMMUNITY
Start: 2020-11-27 | End: 2021-01-01

## 2021-01-01 RX ORDER — LANOLIN ALCOHOL/MO/W.PET/CERES
5 CREAM (GRAM) TOPICAL AT BEDTIME
Refills: 0 | Status: DISCONTINUED | OUTPATIENT
Start: 2021-01-01 | End: 2021-01-01

## 2021-01-01 RX ORDER — OMEPRAZOLE 20 MG/1
20 TABLET, DELAYED RELEASE ORAL
Refills: 0 | Status: DISCONTINUED | COMMUNITY
End: 2021-01-01

## 2021-01-01 RX ORDER — METOPROLOL TARTRATE 50 MG
50 TABLET ORAL DAILY
Refills: 0 | Status: DISCONTINUED | OUTPATIENT
Start: 2021-01-01 | End: 2021-01-01

## 2021-01-01 RX ORDER — OXYCODONE HYDROCHLORIDE 5 MG/1
5 TABLET ORAL EVERY 4 HOURS
Refills: 0 | Status: DISCONTINUED | OUTPATIENT
Start: 2021-01-01 | End: 2021-01-01

## 2021-01-01 RX ORDER — ACYCLOVIR 400 MG/1
400 TABLET ORAL
Qty: 20 | Refills: 11 | Status: DISCONTINUED | COMMUNITY
Start: 2019-08-08 | End: 2021-01-01

## 2021-01-01 RX ADMIN — OXYCODONE HYDROCHLORIDE 5 MILLIGRAM(S): 5 TABLET ORAL at 11:15

## 2021-01-01 RX ADMIN — OXYCODONE HYDROCHLORIDE 5 MILLIGRAM(S): 5 TABLET ORAL at 03:56

## 2021-01-01 RX ADMIN — OXYCODONE HYDROCHLORIDE 10 MILLIGRAM(S): 5 TABLET ORAL at 20:46

## 2021-01-01 RX ADMIN — LOSARTAN POTASSIUM 50 MILLIGRAM(S): 100 TABLET, FILM COATED ORAL at 10:30

## 2021-01-01 RX ADMIN — AMLODIPINE BESYLATE 5 MILLIGRAM(S): 2.5 TABLET ORAL at 09:59

## 2021-01-01 RX ADMIN — HEPARIN SODIUM 5000 UNIT(S): 5000 INJECTION INTRAVENOUS; SUBCUTANEOUS at 22:31

## 2021-01-01 RX ADMIN — Medication 400 MILLIGRAM(S): at 05:53

## 2021-01-01 RX ADMIN — HEPARIN SODIUM 5000 UNIT(S): 5000 INJECTION INTRAVENOUS; SUBCUTANEOUS at 05:26

## 2021-01-01 RX ADMIN — Medication 400 MILLIGRAM(S): at 18:54

## 2021-01-01 RX ADMIN — HEPARIN SODIUM 5000 UNIT(S): 5000 INJECTION INTRAVENOUS; SUBCUTANEOUS at 05:52

## 2021-01-01 RX ADMIN — Medication 500 MILLIGRAM(S): at 06:15

## 2021-01-01 RX ADMIN — Medication 500 MILLIGRAM(S): at 20:04

## 2021-01-01 RX ADMIN — Medication 15 MILLIGRAM(S): at 02:54

## 2021-01-01 RX ADMIN — FENTANYL CITRATE 50 MICROGRAM(S): 50 INJECTION INTRAVENOUS at 15:06

## 2021-01-01 RX ADMIN — DEXTROSE MONOHYDRATE, SODIUM CHLORIDE, AND POTASSIUM CHLORIDE 120 MILLILITER(S): 50; .745; 4.5 INJECTION, SOLUTION INTRAVENOUS at 05:52

## 2021-01-01 RX ADMIN — DEXTROSE MONOHYDRATE, SODIUM CHLORIDE, AND POTASSIUM CHLORIDE 120 MILLILITER(S): 50; .745; 4.5 INJECTION, SOLUTION INTRAVENOUS at 06:15

## 2021-01-01 RX ADMIN — HEPARIN SODIUM 5000 UNIT(S): 5000 INJECTION INTRAVENOUS; SUBCUTANEOUS at 13:43

## 2021-01-01 RX ADMIN — NEOMYCIN SULFATE 1000 MILLIGRAM(S): 500 TABLET ORAL at 20:01

## 2021-01-01 RX ADMIN — FENTANYL CITRATE 50 MICROGRAM(S): 50 INJECTION INTRAVENOUS at 15:00

## 2021-01-01 RX ADMIN — Medication 400 MILLIGRAM(S): at 12:51

## 2021-01-01 RX ADMIN — HEPARIN SODIUM 5000 UNIT(S): 5000 INJECTION INTRAVENOUS; SUBCUTANEOUS at 17:12

## 2021-01-01 RX ADMIN — OXYCODONE HYDROCHLORIDE 10 MILLIGRAM(S): 5 TABLET ORAL at 21:15

## 2021-01-01 RX ADMIN — SODIUM CHLORIDE 100 MILLILITER(S): 9 INJECTION, SOLUTION INTRAVENOUS at 15:07

## 2021-01-01 RX ADMIN — LOSARTAN POTASSIUM 50 MILLIGRAM(S): 100 TABLET, FILM COATED ORAL at 22:14

## 2021-01-01 RX ADMIN — Medication 500 MILLIGRAM(S): at 22:27

## 2021-01-01 RX ADMIN — Medication 50 MILLIGRAM(S): at 22:15

## 2021-01-01 RX ADMIN — LOSARTAN POTASSIUM 50 MILLIGRAM(S): 100 TABLET, FILM COATED ORAL at 09:58

## 2021-01-01 RX ADMIN — Medication 5 MILLIGRAM(S): at 23:45

## 2021-01-01 RX ADMIN — Medication 50 MILLIGRAM(S): at 10:31

## 2021-01-01 RX ADMIN — OXYCODONE HYDROCHLORIDE 5 MILLIGRAM(S): 5 TABLET ORAL at 17:00

## 2021-01-01 RX ADMIN — NEOMYCIN SULFATE 1000 MILLIGRAM(S): 500 TABLET ORAL at 22:27

## 2021-01-01 RX ADMIN — OXYCODONE HYDROCHLORIDE 5 MILLIGRAM(S): 5 TABLET ORAL at 16:22

## 2021-01-01 RX ADMIN — AMLODIPINE BESYLATE 5 MILLIGRAM(S): 2.5 TABLET ORAL at 10:31

## 2021-01-01 RX ADMIN — Medication 650 MILLIGRAM(S): at 12:46

## 2021-01-01 RX ADMIN — Medication 400 MILLIGRAM(S): at 22:15

## 2021-01-01 RX ADMIN — AMLODIPINE BESYLATE 5 MILLIGRAM(S): 2.5 TABLET ORAL at 22:15

## 2021-01-01 RX ADMIN — Medication 650 MILLIGRAM(S): at 13:01

## 2021-01-01 RX ADMIN — Medication 650 MILLIGRAM(S): at 02:55

## 2021-01-01 RX ADMIN — Medication 50 MILLIGRAM(S): at 09:58

## 2021-01-01 RX ADMIN — DEXTROSE MONOHYDRATE, SODIUM CHLORIDE, AND POTASSIUM CHLORIDE 120 MILLILITER(S): 50; .745; 4.5 INJECTION, SOLUTION INTRAVENOUS at 17:48

## 2021-01-01 RX ADMIN — OXYCODONE HYDROCHLORIDE 5 MILLIGRAM(S): 5 TABLET ORAL at 10:41

## 2021-01-01 RX ADMIN — Medication 650 MILLIGRAM(S): at 17:50

## 2021-01-01 RX ADMIN — HEPARIN SODIUM 5000 UNIT(S): 5000 INJECTION INTRAVENOUS; SUBCUTANEOUS at 22:14

## 2021-01-01 RX ADMIN — NEOMYCIN SULFATE 1000 MILLIGRAM(S): 500 TABLET ORAL at 06:15

## 2021-03-10 ENCOUNTER — RX RENEWAL (OUTPATIENT)
Age: 56
End: 2021-03-10

## 2021-06-05 NOTE — HISTORY OF PRESENT ILLNESS
[FreeTextEntry1] : pt presents for med check  [de-identified] : He has been seeing Dr Guerra for the diabetes and his recent sugar readings are much better.  He is still having maintenance chemotherapy every 2 weeks.  Unfortunatly his wife developed anal cancer and just finished RT and chemo.  His last eye exam was about 18 months ago.  He has been following a healthier diet and has lost weight.

## 2021-06-05 NOTE — PLAN
[FreeTextEntry1] : ophthalmology consultation was advised, I will obtain a copy of his latest lab report and review it when available, his hypertension is stable and medications were renewed and he will follow up in 6 months or sooner prn

## 2021-07-20 NOTE — HISTORY OF PRESENT ILLNESS
[de-identified] : Mr. ANAIS PUENTE is a 56 year old male with history of colon cancer diagnosed in 2019. Patient was found to have a sigmoid cancer with multiple other polyps which were removed. Patient on staging was noted to have both liver and lung metastases and was elected to treat with chemotherapy. The primary lesion was not removed. The patient has responded clinically and is receiving every two weeks irinotecan. Patient does note progressive constipation with thinning of stools. There has been no nausea or vomiting.\par

## 2021-07-20 NOTE — ASSESSMENT
[FreeTextEntry1] : 57 yo male with personal history of colon cancer and progressive constipation. Symptoms could be related to antiemetics, but patient does report possible obstructive symptoms. Will obtain CT scan and consider surgery vs stent vs medication.

## 2021-08-17 NOTE — CONSULT NOTE ADULT - SUBJECTIVE AND OBJECTIVE BOX
57 yo M with PMH of HTN, DM and stage IV rectal cancer , got diagnosed on 2019 with metastasis to the liver and lung. Patient is on chemotherapy. He went for sigmoidoscopy today with GI and a completely obstructing rectal mass was identified and Colorectal surgery was consulted for possible colostomy creation. Patient mentioned that he is passing gas.     ICU Vital Signs Last 24 Hrs  T(C): 37 (17 Aug 2021 18:18), Max: 37 (17 Aug 2021 18:18)  T(F): 98.6 (17 Aug 2021 18:18), Max: 98.6 (17 Aug 2021 18:18)  HR: 83 (17 Aug 2021 18:18) (60 - 83)  BP: 132/83 (17 Aug 2021 18:18) (120/68 - 132/83)  BP(mean): --  ABP: --  ABP(mean): --  RR: 16 (17 Aug 2021 18:18) (14 - 18)  SpO2: 100% (17 Aug 2021 18:18) (98% - 100%)        PE:  General: patient is in no acute distress.  Abd: soft, non distended, non tender.

## 2021-08-18 NOTE — BRIEF OPERATIVE NOTE - OPERATION/FINDINGS
Almost completely obstructing sigmoid mass from 7-15 cm. Tattoo'd. No stent placed as ostomy better option.
Mass in rectum tattooed was visualized

## 2021-08-19 NOTE — DISCHARGE NOTE NURSING/CASE MANAGEMENT/SOCIAL WORK - NSDCPEFALRISK_GEN_ALL_CORE
For information on Fall & injury Prevention, visit [email or web address]

## 2021-08-19 NOTE — DISCHARGE NOTE PROVIDER - NSDCMRMEDTOKEN_GEN_ALL_CORE_FT
amLODIPine 5 mg oral tablet: 1 tab(s) orally once a day  Flonase 50 mcg/inh nasal spray: 1 spray(s) nasal once a day  losartan 50 mg oral tablet: 1 tab(s) orally once a day  metFORMIN 500 mg oral tablet: 1 tab(s) orally 2 times a day  metoprolol succinate 50 mg oral tablet, extended release: 1 tab(s) orally once a day  omeprazole 40 mg oral delayed release capsule: 1 cap(s) orally once a day  oxycodone-acetaminophen 5 mg-325 mg oral tablet: 1 tab(s) orally every 6 hours, As Needed -for moderate pain [id number]  [place] SoloStar 300 units/mL subcutaneous solution: 6 unit(s) subcutaneous once a day

## 2021-08-19 NOTE — PROGRESS NOTE ADULT - ATTENDING COMMENTS
Patient seen and examined this morning.  Status post laparoscopic loop colostomy for obstructing metastatic rectal cancer.  Doing well.  Complains of pain around the ostomy site which is healthy and already producing stool.  Advance diet as tolerated.  Ostomy teaching.  Anticipate discharge in the next 1 to 2 days

## 2021-08-19 NOTE — DISCHARGE NOTE NURSING/CASE MANAGEMENT/SOCIAL WORK - NSDCCRNAME_GEN_ALL_CORE_FT
Yellow discharge planning folder provided including private hire list, home care agency list, community and cancer resources

## 2021-08-19 NOTE — DISCHARGE NOTE PROVIDER - NSDCCPCAREPLAN_GEN_ALL_CORE_FT
PRINCIPAL DISCHARGE DIAGNOSIS  Diagnosis: Metastasis from rectal cancer  Assessment and Plan of Treatment:

## 2021-08-19 NOTE — DISCHARGE NOTE PROVIDER - CARE PROVIDER_API CALL
Ayanna Mcghee (NP; RN)  NP in Primary Care AdultGerontology  [address] [place] [address]  Phone: [phone]  Fax: [phone]  Follow Up Time: 2 weeks

## 2021-08-19 NOTE — DISCHARGE NOTE NURSING/CASE MANAGEMENT/SOCIAL WORK - PATIENT PORTAL LINK FT
You can access the FollowMyHealth Patient Portal offered by [facility] by registering at the following website: [email or web address]. By joining [facility]’s FollowMyHealth portal, you will also be able to view your health information using other applications (apps) compatible with our system.

## 2021-08-20 NOTE — PATIENT PROFILE ADULT - DOES PATIENT HAVE ADVANCE DIRECTIVE
Clary Lozano Hospitalist   Progress Note    Admitting Date and Time: 9/19/2019 12:48 PM  Admit Dx: COPD (chronic obstructive pulmonary disease) ([facility] [J44.9]  COPD (chronic obstructive pulmonary disease) ([facility] [J44.9]    Subjective:    Patient was admitted with COPD (chronic obstructive pulmonary disease) ([facility] [J44.9]  COPD (chronic obstructive pulmonary disease) ([facility] [J44.9]. Patient feels a little better as she is breathing easier. Patient seen after having a bath she did appear slightly winded but recover within minutes. Patient denies any chest pain, increased shortness of breath, nausea/vomiting. ROS: denies fever, chills, cp, sob, n/v, HA unless stated above.      methylPREDNISolone  40 mg Intravenous Q12H    econazole nitrate   Topical BID    aspirin  81 mg Oral Daily    bumetanide  1 mg Oral Daily    cetirizine  10 mg Oral Daily    vitamin D  1,000 Units Oral Daily    clopidogrel  75 mg Oral Daily    DULoxetine  60 mg Oral BID    gabapentin  600 mg Oral TID    isosorbide mononitrate  60 mg Oral Daily    meloxicam  7.5 mg Oral Daily    metoprolol succinate  25 mg Oral BID    mometasone-formoterol  2 puff Inhalation Q12H    montelukast  10 mg Oral Nightly    pantoprazole  40 mg Oral BID    rosuvastatin  5 mg Oral Daily    tiotropium  18 mcg Inhalation Daily    sacubitril-valsartan  1 tablet Oral BID    famotidine  20 mg Oral BID    fluticasone  1 spray Nasal BID    ipratropium-albuterol  1 ampule Inhalation Q4H WA    insulin lispro  0-12 Units Subcutaneous TID WC    insulin lispro  0-6 Units Subcutaneous Nightly    hydrALAZINE  100 mg Oral TID    sodium chloride flush  10 mL Intravenous 2 times per day    enoxaparin  40 mg Subcutaneous Daily    doxycycline hyclate  100 mg Oral 2 times per day    cefTRIAXone (ROCEPHIN) IV  1 g Intravenous Q24H       baclofen 10 mg BID PRN   HYDROcodone-acetaminophen 1 tablet Q6H PRN   sodium chloride flush 10 mL PRN   magnesium hydroxide 30 mL Daily PRN   ondansetron 4 mg Q6H PRN        Objective:    /75   Pulse 62   Temp 98.2 °F (36.8 °C) (Oral)   Resp 18   Ht 5' 1\" (1.549 m)   Wt 242 lb 4.8 oz (109.9 kg)   LMP 01/01/1990   SpO2 94%   BMI 45.78 kg/m²   General Appearance: well-developed and well nourished, in no acute distress, alert and obese  Skin: warm and dry  Head: normocephalic and atraumatic  Eyes: pupils equal, round, and reactive to light and conjunctivae normal  ENT: hearing grossly normal bilaterally  Neck: neck supple and non tender without mass   Pulmonary/Chest: decreased breath sounds noted-throughout with improved aeration  Cardiovascular: normal rate, regular rhythm and intact distal pulses  Abdomen: soft, non-tender, non-distended, normal bowel sounds, no masses or organomegaly  Extremities: no cyanosis, no clubbing and no edema  Musculoskeletal: normal range of motion, no joint swelling, deformity or tenderness  Neurologic: no cranial nerve deficit, speech normal and gait abnormal-unsteady uses a walker      Recent Labs     09/20/19  1410 09/22/19  1048    133   K 5.4* 5.5*   CL 95* 94*   CO2 28 31*   BUN 25* 39*   CREATININE 1.3* 1.3*   GLUCOSE 214* 331*   CALCIUM 9.4 9.1       Recent Labs     09/20/19  1410   WBC 11.0   RBC 3.97   HGB 12.3   HCT 40.5   .0*   MCH 31.0   MCHC 30.4*   RDW 13.5      MPV 10.8         Radiology:   NM LUNG VENT/PERFUSION (VQ)   Final Result   There is a low probability of pulmonary thromboembolic   disease. XR CHEST PORTABLE   Final Result      Cardiomegaly with with median sternotomy      There is ill-defined airspace consolidation seen throughout both lung   more pronounced on the right lower suspicion for pneumonic process.              Assessment:    Principal Problem:    COPD (chronic obstructive pulmonary disease) (HCC)  Active Problems:    Chronic combined systolic and diastolic heart failure (HCC)    DAYAN and COPD overlap syndrome ([address]    Morbid Yes

## 2021-08-20 NOTE — CONSULT NOTE ADULT - ASSESSMENT
56 male w stage 4 sigmoid ca managed by Dr. Boston at [facility] on FOLFIRI +Jeffery (JEFFERY HELD 4/2021), last chemo on 7/23/2021 has had obstructive rectal mass.    Noted on sigmoidoscopy to have obstruction  Now s/p lap diverting colostomy    -recovering well  -pt will f/u with Dr. Boston at [facility] to resume chemo    We will be happy to answer any onc questions on behalf of [facility] and will be in touch with them.      Cleveland Chavarria MD  Hematology/Oncology  Cell:  [phone]  Office Phone: [phone]  Office Fax:  [phone]  [facility] [id number] 
57 yo M with stage IV rectal cancer on chemotherapy, sigmoidoscopy was done today and showed near completely obstructing rectal cancer.       Plan:  - Patient is scheduled for colostomy creation tomorrow.    - NPO after midnight  - Get Perop labs.  - Please starte antibiotics prep ( Neomycin and flagyl x3 )  - No need for laxatives prep considering the patient has obstructing mass.   - Please get hospitalist clearance for surgery tomorrow.    Plan was discussed with Dr. Novoa.

## 2021-08-20 NOTE — PROGRESS NOTE ADULT - ASSESSMENT
55 yo M with stage IV rectal cancer on chemotherapy, sigmoidoscopy was done today and showed near completely obstructing rectal cancer.       Plan:  Pain control PRN  Tolerating regular diet  colon demetrio removed today  Patient ready for discharge.  PT evaluation before d/c          Plan was discussed with Dr. Scott  
55 yo M with stage IV rectal cancer on chemotherapy, sigmoidoscopy was done today and showed near completely obstructing rectal cancer.       Plan:  Pain control PRN  Tolerating FLD; Advance to regular  Monitor BF  Encourage IS use  Voiding freely  OOB Ambulate  DVT ppx    Plan discussed with Dr. Butts          Plan was discussed with Dr. Butts

## 2021-08-20 NOTE — PROGRESS NOTE ADULT - SUBJECTIVE AND OBJECTIVE BOX
Patient seen and examined at bedside. Patient has no complaints and reports pain is under control. Patient is tolerating full liquid diet without nausea or vomiting and is having ostomy output.  Nurse denies any acute events. Vitals reviewed and WNL.        Vitals:  T(C): 37.1 ( @ 04:18), Max: 37.2 ( @ 21:45)  HR: 58 ( 04:18) (58 - 77)  BP: 130/84 ( @ 04:18) (116/78 - 147/89)  RR: 18 ( 04:18) (10 - 21)  SpO2: 97% ( 04:18) (95% - 100%)     @ 07:01  -   @ 07:00  --------------------------------------------------------  IN:    Other (mL): 740 mL  Total IN: 740 mL    OUT:  Total OUT: 0 mL    Total NET: 740 mL       @ 07:01  -  08 @ 08:48  --------------------------------------------------------  IN:  Total IN: 0 mL    OUT:    Voided (mL): 425 mL  Total OUT: 425 mL    Total NET: -425 mL          Physical Exam:  General: AAOx3, Well developed, NAD  Chest: Normal respiratory effort  Heart: RRR  Abdomen: Soft, NTND, no masses  Neuro/Psych: No localized deficits. Normal speech, normal tone  Skin: Normal, no rashes, no lesions noted.   Extremities: Warm, well perfused, no edema, Pulses intact     @ 07:44                    -  CBC: ->)-------(<-                     -                 139 | 106 | 11    CMP:  ----------------------< 113               4.2 | 28 | 1.00                      Ca:8.7  Phos:3.8  M.3               -|      |-        LFTs:  ------|-|-----             -|      |-  08-17 @ 21:48                    13.1  CBC: 5.18>)-------(<132                     38.5                 136 | 104 | 14    CMP:  ----------------------< 109               3.9 | 29 | 0.97                      Ca:9.1  Phos:3.6  M.5               0.7|      |40        LFTs:  ------|136|-----             -|      |-      Current Inpatient Medications:  acetaminophen   Tablet .. 650 milliGRAM(s) Oral every 6 hours PRN  acetaminophen  IVPB .. 1000 milliGRAM(s) IV Intermittent every 6 hours  amLODIPine   Tablet 5 milliGRAM(s) Oral daily  dextrose 40% Gel 15 Gram(s) Oral once  dextrose 5% + sodium chloride 0.45% with potassium chloride 20 mEq/L 1000 milliLiter(s) (120 mL/Hr) IV Continuous <Continuous>  dextrose 5%. 1000 milliLiter(s) (50 mL/Hr) IV Continuous <Continuous>  dextrose 5%. 1000 milliLiter(s) (100 mL/Hr) IV Continuous <Continuous>  dextrose 50% Injectable 25 Gram(s) IV Push once  dextrose 50% Injectable 12.5 Gram(s) IV Push once  dextrose 50% Injectable 25 Gram(s) IV Push once  fluticasone propionate (50 MICROgram(s)/actuation) Nasal Spray - Peds 1 Spray(s) Alternating Nostrils daily  glucagon  Injectable 1 milliGRAM(s) IntraMuscular once  heparin   Injectable 5000 Unit(s) SubCutaneous every 8 hours  insulin lispro (ADMELOG) corrective regimen sliding scale   SubCutaneous three times a day before meals  losartan 50 milliGRAM(s) Oral daily  metoprolol succinate ER 50 milliGRAM(s) Oral daily  ondansetron Injectable 4 milliGRAM(s) IV Push every 6 hours PRN  oxyCODONE    IR 5 milliGRAM(s) Oral every 4 hours PRN  oxyCODONE    IR 10 milliGRAM(s) Oral every 4 hours PRN                                              
patient with known metastatic rectal cancer with impending obstruction. will need diverting stoma. risk and benefits explained including bleeding, infections, sepsis, dvt, pe, mi, hernias, and death.
Patient seen and examined at bedside. Patient has no complaints and reports pain is under control. Patient is tolerating low fiber diet without nausea or vomiting and is having ostomy output.  Nurse denies any acute events. Vitals reviewed and WNL. colon demetrio has been removed today during round.     ICU Vital Signs Last 24 Hrs  T(C): 37.5 (20 Aug 2021 09:44), Max: 37.6 (19 Aug 2021 22:26)  T(F): 99.5 (20 Aug 2021 09:44), Max: 99.7 (19 Aug 2021 22:26)  HR: 79 (20 Aug 2021 09:44) (72 - 79)  BP: 126/79 (20 Aug 2021 09:44) (112/76 - 126/79)  BP(mean): --  ABP: --  ABP(mean): --  RR: 16 (20 Aug 2021 09:44) (16 - 18)  SpO2: 98% (20 Aug 2021 09:44) (94% - 98%)      Physical Exam:  General: AAOx3, Well developed, NAD  Chest: Normal respiratory effort  Heart: RRR  Abdomen: Soft, NTND, no masses  Neuro/Psych: No localized deficits. Normal speech, normal tone  Skin: Normal, no rashes, no lesions noted.   Extremities: Warm, well perfused, no edema, Pulses intact                                12.9   5.30  )-----------( 97       ( 20 Aug 2021 07:00 )             38.3   08-20    138  |  105  |  12  ----------------------------<  102<H>  4.2   |  29  |  1.00    Ca    8.7      20 Aug 2021 07:00  Phos  3.2     08-20  Mg     2.2     08-20

## 2021-08-24 PROBLEM — E11.9 TYPE 2 DIABETES MELLITUS WITHOUT COMPLICATIONS: Chronic | Status: ACTIVE | Noted: 2021-01-01

## 2021-08-24 PROBLEM — I10 ESSENTIAL (PRIMARY) HYPERTENSION: Chronic | Status: ACTIVE | Noted: 2021-01-01

## 2021-08-24 PROBLEM — K21.9 GASTRO-ESOPHAGEAL REFLUX DISEASE WITHOUT ESOPHAGITIS: Chronic | Status: ACTIVE | Noted: 2021-01-01

## 2021-08-24 PROBLEM — C18.9 MALIGNANT NEOPLASM OF COLON, UNSPECIFIED: Chronic | Status: ACTIVE | Noted: 2021-01-01

## 2021-08-24 PROBLEM — Z98.890 OTHER SPECIFIED POSTPROCEDURAL STATES: Chronic | Status: ACTIVE | Noted: 2021-01-01

## 2021-08-24 PROBLEM — C78.7 SECONDARY MALIGNANT NEOPLASM OF LIVER AND INTRAHEPATIC BILE DUCT: Chronic | Status: ACTIVE | Noted: 2021-01-01

## 2021-09-07 PROBLEM — C18.7 MALIGNANT NEOPLASM OF SIGMOID COLON: Status: ACTIVE | Noted: 2021-01-01

## 2021-09-07 NOTE — HISTORY OF PRESENT ILLNESS
[FreeTextEntry1] : s/p lap sigmoid loop colostomy for advanced colon cancer\par \par Since discharge from hospital, he feels good from surgery. Has been "functioning" at home.  Has been miralax daily to address the initial constipation.  Currently,with soft stools.  Taking motrin as needed.  Has moderate pain in pelvis and lower back. \par \par Has appointment to see Dr. Grace Boston (oncology) @ [facility] either this or next week to discuss chemotherapy.\par

## 2021-09-07 NOTE — ASSESSMENT
[FreeTextEntry1] : Metastatic colorectal cancer\par --s/p lap sigmoid colostomy for large bowel obstruction.  suspected advanced colon cancer.  Pt with evidence of stage 4 disease.\par --doing well surgically.  Pt's main pain complaints likely related to the primary colon cancer.\par --Pt wishes for nonconstipating pain relievers.  We discussed tylenol, ibuprofen.  he does not wish to have narcotics at this time given his issues with constipation.\par --issues with constipation managed with daily miralax.  Pt with bowel function at present.  No leakage noted.\par --Pt should followup with his oncologist for treatment of the primary.  Followup in 1 month for postop check.

## 2021-09-21 PROBLEM — G47.30 SLEEP APNEA, UNSPECIFIED TYPE: Status: ACTIVE | Noted: 2021-01-01

## 2021-10-05 NOTE — ASSESSMENT
[FreeTextEntry1] : 56-year-old male with metastatic rectal cancer post diverting colostomy for obstruction. Doing better. Recommend consultation with Dr. Valdivia  and Aileen for possible chemo and xrt

## 2021-10-05 NOTE — PHYSICAL EXAM
[Abdomen Masses] : No abdominal masses [Abdomen Tenderness] : ~T No ~M abdominal tenderness [Tender] : nontender [de-identified] : Ostomy left upper quadrant [de-identified] : Looks well in no distress, of stated age.

## 2021-10-05 NOTE — HISTORY OF PRESENT ILLNESS
[FreeTextEntry1] : 56-year-old male with metastatic rectal cancer underwent emergency diverting colostomy by me. Currently feeling much better happy with the colostomy at this moment in time He is taking care mostly at [facility] for his chemotherapy

## 2021-10-07 NOTE — RESULTS/DATA
[FreeTextEntry1] : 8/17/21 Sigmoidoscopy:  An ulcerated almost completely obstructing large mass was found in the sigmoid colon. The mass was circumferential. The mass measured eight cm \par in length (7 cm to 15 cm). Oozing was present. The gastroscope (9.9 mm) barely was able to traverse the mass.  Area at the distal side was successfully injected with 3 mL Spot (carbon black) for tattooing.\par Impression: No specimens collected.\par \par 8/17/21 MRI Rectum: Circumferential upper rectal tumor with invasion of the anterior peritoneal reflection, extramural vascular invasion, and bulky mesorectal and superior rectal lianet metastases. Indeterminate left common iliac node. If anal canal involved Anal canal involved. Suspicious extra-TME nodes present: Equicocal-indeterminate left common iliac node. \par \par 8/17/21 Colonoscopy:  no specimens collected; impending obstruction. \par \par 8/3/21 CT A/P: Multiple pulmonary nodules consistent with metastases with overall interval decrease in the number of nodules compared with prior CT of 2019. Correlation with dedicated CT of chest is suggested as clinically warranted. Interval decrease in the number and size of metastatic hepatic lesions with persistent multiple lesions as described. Mildly enlarged periportal lymph nodes without significant interval change. Circumferential wall thickening of the proximal to mid rectum consistent with known rectal carcinoma associated with apparent luminal narrowing. Soft tissue masses within the rectal mesocolon suspicious for local metastases or adenopathy, new since the prior examination. The overall caliber of the rectal mass appears somewhat decreased in transverse diameter compared with the prior CT of 2019. Moderate amount of stool within the large bowel proximal to the rectal lesion with large fatty content. Correlation with patients symptomatology and follow up is recommended. Mild improvement in previously noted enlarged lymph nodes in the retroperitoneum posterior to the head of the pancreas. \par \par 6/28/19 Path: Sigmoid:  Adenocarcinoma of colon, moderately to poorly differentiated, invasive. \par \par 6/28/19 Colonoscopy: A 10mm polyp was found in the ascending colon, polyp was sessile. A 10mm polyp was found in the hepatic flexure, polyp was sessile. A 5mm polyp was found in the proximal ascending colon, polyp was sessile. Three sessile polyps were found in the distal transverse colon, all 10mm in size. Non-bleeding hemorrhoids were found during retroflexion. The hemorrhoids were Grade 1. 2 sessile polyps were found in the sigmoid colon, both 10mm in size. A fungating and infiltrative partially obstructing large mass was was found in the sigmoid colon from 15cm to 20cm. The mass was partially circumferential. The mass measured 5cm in length, Oozing was present. Area was tattooed with an injection of 3ml of kenyetta ink at both the proximal and distal aspect of the mass.

## 2021-10-07 NOTE — HISTORY OF PRESENT ILLNESS
[de-identified] : The patient was diagnosed with stage IV sigmoid / rectal cancer in June 2019 at the age of 54. He underwent a colonoscopy June 28, 2019 and multiple sessile polyps were found and a fungating and infiltrative partially obstructing large mass was found in the sigmoid colon from 15cm to 20cm, circumferential.  Pathology showed adenocarcinoma of colon. [place] [facility] [place]jenae (JENAE HELD 4/2021), last treated on 7/23/2021.  CT A/P on 08/3/21 showed multiple pulmonary nodules consistent with metastases with overall interval decrease in the number of nodules compared with prior CT of 2019.  Interval decrease in the number and size of metastatic hepatic lesions with persistent multiple lesions. Mildly enlarged periportal lymph nodes without significant interval change. Circumferential wall thickening of the proximal to mid rectum consistent with known rectal carcinoma associated with apparent luminal narrowing. Soft tissue masses within the rectal mesocolon suspicious for local metastases or adenopathy, new since the prior examination. The overall caliber of the rectal mass appears somewhat decreased in transverse diameter compared with the prior CT of 2019. Mild improvement in previously noted enlarged lymph nodes in the retroperitoneum posterior to the head of the pancreas. \par \par On 8/17/21 an MRI rectum showed circumferential upper rectal tumor with invasion of the anterior peritoneal reflection, extramural vascular invasion, and bulky mesorectal and superior rectal lianet metastases. Indeterminate left common iliac node.  Anal canal involved. Suspicious extra-TME nodes present: equicocal-indeterminate left common iliac node. On 8/17/21 a sigmoidoscopy was performed and an ulcerated almost completely obstructing large mass was found in the sigmoid colon. The mass was circumferential. The mass measured eight cm in length (7 cm to 15 cm). The gastroscope barely was able to traverse the mass.  He is s/p lap sigmoid colostomy for large bowel obstruction 8/18/21. \par  [de-identified] : PMH of HTN, DM and stage IV rectal cancer, with metastasis to the liver and lung\par Social hx: never smoker; rarely ETOH\par Family Hx: sister (teenager) sarcoma [de-identified] : Pt presents today s/p diverting colostomy.  He reports persistent constipation prior to procedure and " ribbon like stool".

## 2021-10-12 NOTE — HISTORY OF PRESENT ILLNESS
[FreeTextEntry1] : The patient is a pleasant 56 year old male diagnosed with metastatic rectal cancer. He was diagnosed with Stage IV sigmoid/rectal cancer in 2019, June at age 54. Colonoscopy 6/28/2019 revealed multiple sessile polyps and a fungating, infiltrative large mass in the sigmoid colon. Pathology was positive for adenocarcinoma. CT showed nodules in lungs and liver lesions consistent with metastatic disease, the largest was 8 cm. [place] [facility] [place]JAMIN.  CT abdomen and pelvis on 8/3/21 demonstrated multiple pulmonary nodules comparing to previous scan in 2019 but overall interval decrease in size and number. Persistent multiple lesions of the liver with interval decrease in size and number. Mildly enlarged periportal lymph nodes without significant interval change. Circumferential wall thickening of the proximal to mid rectum consistent with known rectal carcinoma associated with apparent luminal narrowing. Soft tissue masses within the rectal mesocolon suspicious for local metastases or adenopathy, new since the prior examination. The overall caliber of the rectal mass appears somewhat decreased in transverse diameter compared with the prior CT of 2019. Mild improvement in previously noted enlarged lymph nodes in the retroperitoneum posterior to the head of the pancreas. \par  MRI rectum on 8/17/21  showed circumferential upper rectal tumor with invasion of the anterior peritoneal reflection, extramural vascular invasion, and bulky mesorectal and superior rectal lianet metastases. Indeterminate left common iliac node.  Anal canal involved. Suspicious extra-TME nodes present and indeterminate left common iliac node. He had a sigmoidoscopy performed on 8/17/21 which showed an ulcerated almost completely obstructing large mass in the sigmoid colon. The mass was circumferential measuring 8 cm in length (7 cm to 15 cm) and the scope could barely pass the mass.  He underwent a diverting colostomy by Dr. Novoa on 8/18/21. His pain is much improved and he is using Tylenol and motrin prn for pain at his tailbone. He has been receiving Irinotecan and Avastin [id number]. He presents to discuss the role of radiation therapy in his care.\par

## 2021-10-12 NOTE — VITALS
[Maximal Pain Intensity: 4/10] : 4/10 [Least Pain Intensity: 0/10] : 0/10 [NoTreatment Scheduled] : no treatment scheduled [OTC] : OTC [Date: ____________] : Patient's last distress assessment performed on [unfilled]. [4 - Distress Level] : Distress Level: 4 [Referred Patient  to social work for follow-up] : Patient was referred to social work for follow-up [80: Normal activity with effort; some signs or symptoms of disease.] : 80: Normal activity with effort; some signs or symptoms of disease.

## 2021-10-12 NOTE — PHYSICAL EXAM
[Abdomen Soft] : soft [Nondistended] : nondistended [Abdomen Tenderness] : non-tender [] : no hepato-splenomegaly [Normal] : oriented to person, place and time, the affect was normal, the mood was normal and not anxious [de-identified] : ostomy intact with soft brown stool

## 2021-10-12 NOTE — REVIEW OF SYSTEMS
[Recent Change In Weight] : ~T recent weight change [Abdominal Pain] : abdominal pain [Insomnia] : insomnia [Anxiety] : anxiety [Negative] : Allergic/Immunologic [FreeTextEntry7] : colostomy [FreeTextEntry9] : body aches

## 2021-10-15 NOTE — CONSULT LETTER
[Dear  ___] : Dear  [unfilled], [Consult Letter:] : I had the pleasure of evaluating your patient, [unfilled]. [Please see my note below.] : Please see my note below. [Consult Closing:] : Thank you very much for allowing me to participate in the care of this patient.  If you have any questions, please do not hesitate to contact me. [Sincerely,] : Sincerely, [DrXavier  ___] : Dr. BRODY [DrXvaier ___] : Dr. BRODY [FreeTextEntry3] : Dr. Makenna Valdivia

## 2021-10-15 NOTE — RESULTS/DATA
[FreeTextEntry1] : 10/13/2021 CT: Numerous pulmonary parenchymal nodules throughout both lungs.  The largest lung masses in the posterior aspect of the right upper lobe measures 2.4 x 2.3 cm.  Nodules at the bases of the lungs on previous CT are unchanged.  Multiple hypoechoic liver masses.  The largest mass in the posterior aspect of the dome of the right hepatic lobe measures 8.8 x 7.9 cm, up from 8.1 x 6.2 cm previously.  The remaining hepatic lesions appear increased in size and number compared with the previous study as well.  Area of masslike thickening in the distal sigmoid colon and rectum relative to previous exam.  An adjacent pericolonic mass measures 2.8 x 3.1 cm, compatible with metastatic perirectal lymph node.\par \par 8/17/21 Sigmoidoscopy:  An ulcerated almost completely obstructing large mass was found in the sigmoid colon. The mass was circumferential. The mass measured eight cm \par in length (7 cm to 15 cm). Oozing was present. The gastroscope (9.9 mm) barely was able to traverse the mass.  Area at the distal side was successfully injected with 3 mL Spot (carbon black) for tattooing.\par Impression: No specimens collected.\par \par 8/17/21 MRI Rectum: Circumferential upper rectal tumor with invasion of the anterior peritoneal reflection, extramural vascular invasion, and bulky mesorectal and superior rectal lianet metastases. Indeterminate left common iliac node. If anal canal involved Anal canal involved. Suspicious extra-TME nodes present: Equicocal-indeterminate left common iliac node. \par \par 8/17/21 Colonoscopy:  no specimens collected; impending obstruction. \par \par 8/3/21 CT A/P: Multiple pulmonary nodules consistent with metastases with overall interval decrease in the number of nodules compared with prior CT of 2019.   Interval decrease in the number and size of metastatic hepatic lesions with persistent multiple lesions as described. Mildly enlarged periportal lymph nodes without significant interval change. Circumferential wall thickening of the proximal to mid rectum consistent with known rectal carcinoma associated with apparent luminal narrowing. Soft tissue masses within the rectal mesocolon suspicious for local metastases or adenopathy, new since the prior examination. The overall caliber of the rectal mass appears somewhat decreased in transverse diameter compared with the prior CT of 2019. Mild improvement in previously noted enlarged lymph nodes in the retroperitoneum posterior to the head of the pancreas. \par \par 6/28/19 Path: Sigmoid:  Adenocarcinoma of colon, moderately to poorly differentiated, invasive. \par \par 6/28/19 Colonoscopy: A 10mm polyp was found in the ascending colon, polyp was sessile. A 10mm polyp was found in the hepatic flexure, polyp was sessile. A 5mm polyp was found in the proximal ascending colon, polyp was sessile. Three sessile polyps were found in the distal transverse colon, all 10mm in size. Non-bleeding hemorrhoids were found during retroflexion. The hemorrhoids were Grade 1. 2 sessile polyps were found in the sigmoid colon, both 10mm in size. A fungating and infiltrative partially obstructing large mass was was found in the sigmoid colon from 15cm to 20cm. The mass was partially circumferential. The mass measured 5cm in length, Oozing was present. Area was tattooed with an injection of 3ml of kenyetta ink at both the proximal and distal aspect of the mass.

## 2021-10-15 NOTE — HISTORY OF PRESENT ILLNESS
[de-identified] : The patient was diagnosed with stage IV sigmoid / rectal cancer in June 2019 at the age of 54. He underwent a colonoscopy June 28, 2019 and multiple sessile polyps were found and a fungating and infiltrative partially obstructing large mass was found in the sigmoid colon from 15cm to 20cm, circumferential.  Pathology showed adenocarcinoma of colon. He [place]. [place] [facility] [place]OLFIRI +jenae (JENAE HELD 4/2021), last treated on 7/23/2021.  CT A/P on 08/3/21 showed multiple pulmonary nodules consistent with metastases with overall interval decrease in the number of nodules compared with prior CT of 2019.  Interval decrease in the number and size of metastatic hepatic lesions with persistent multiple lesions. Mildly enlarged periportal lymph nodes without significant interval change. Circumferential wall thickening of the proximal to mid rectum consistent with known rectal carcinoma associated with apparent luminal narrowing. Soft tissue masses within the rectal mesocolon suspicious for local metastases or adenopathy, new since the prior examination. The overall caliber of the rectal mass appears somewhat decreased in transverse diameter compared with the prior CT of 2019. Mild improvement in previously noted enlarged lymph nodes in the retroperitoneum posterior to the head of the pancreas. \par \par On 8/17/21 an MRI rectum showed circumferential upper rectal tumor with invasion of the anterior peritoneal reflection, extramural vascular invasion, and bulky mesorectal and superior rectal lianet metastases. Indeterminate left common iliac node.  Anal canal involved. Suspicious extra-TME nodes present: equicocal-indeterminate left common iliac node. On 8/17/21 a sigmoidoscopy was performed and an ulcerated almost completely obstructing large mass was found in the sigmoid colon. The mass was circumferential. The mass measured eight cm in length (7 cm to 15 cm). The gastroscope barely was able to traverse the mass.  He is s/p lap sigmoid colostomy for large bowel obstruction 8/18/21. \par  [de-identified] : PMH of HTN, DM and stage IV rectal cancer, with metastasis to the liver and lung\par Social hx: never smoker; rarely ETOH\par Family Hx: sister (teenager) sarcoma [de-identified] : Pt presents today s/p diverting colostomy.  He reports persistent constipation prior to procedure and " ribbon like stool".

## 2021-10-21 NOTE — DISEASE MANAGEMENT
[TTNM] : x [NTNM] : x [MTNM] : 1 [de-identified] : 1000 [de-identified] : 8587 [de-identified] : Rectum/Pelvis

## 2021-10-21 NOTE — HISTORY OF PRESENT ILLNESS
[FreeTextEntry1] : The patient is a pleasant 56 year old male diagnosed with metastatic rectal cancer. He was diagnosed with Stage IV sigmoid/rectal cancer in 2019, June at age 54. Colonoscopy 6/28/2019 revealed multiple sessile polyps and a fungating, infiltrative large mass in the sigmoid colon. Pathology was positive for adenocarcinoma. CT showed nodules in lungs and liver lesions consistent with metastatic disease, the largest was 8 cm. [place] [facility] [place]I [place]JAMIN.  CT abdomen and pelvis on 8/3/21 demonstrated multiple pulmonary nodules comparing to previous scan in 2019 but overall interval decrease in size and number. Persistent multiple lesions of the liver with interval decrease in size and number. Mildly enlarged periportal lymph nodes without significant interval change. Circumferential wall thickening of the proximal to mid rectum consistent with known rectal carcinoma associated with apparent luminal narrowing. Soft tissue masses within the rectal mesocolon suspicious for local metastases or adenopathy, new since the prior examination. The overall caliber of the rectal mass appears somewhat decreased in transverse diameter compared with the prior CT of 2019. Mild improvement in previously noted enlarged lymph nodes in the retroperitoneum posterior to the head of the pancreas. \par  MRI rectum on 8/17/21  showed circumferential upper rectal tumor with invasion of the anterior peritoneal reflection, extramural vascular invasion, and bulky mesorectal and superior rectal lianet metastases. Indeterminate left common iliac node.  Anal canal involved. Suspicious extra-TME nodes present and indeterminate left common iliac node. He had a sigmoidoscopy performed on 8/17/21 which showed an ulcerated almost completely obstructing large mass in the sigmoid colon. The mass was circumferential measuring 8 cm in length (7 cm to 15 cm) and the scope could barely pass the mass.  He underwent a diverting colostomy by Dr. Novoa on 8/18/21. His pain is much improved and he is using Tylenol and motrin prn for pain at his tailbone. He has been receiving Irinotecan and Avastin [id number]. He presents to discuss the role of radiation therapy in his care.\par \par He presents for an OTV 2/5 fx. No new complaints, he has been experiencing body aches and stiffness for some time now and it is getting worse. He reports nocturia  which is bothersome and intermittent  nausea (not worse with radiotherapy)

## 2021-10-21 NOTE — VITALS
[Maximal Pain Intensity: 3/10] : 3/10 [70: Cares for self; unalbe to carry on normal activity or do active work.] : 70: Cares for self; unable to carry on normal activity or do active work.

## 2021-10-26 NOTE — DISEASE MANAGEMENT
[Pathological] : TNM Stage: p [IV] : IV [TTNM] : x [NTNM] : x [MTNM] : 1 [de-identified] : 9198 [de-identified] : 7591 [de-identified] : Rectum/Pelvis

## 2021-10-26 NOTE — HISTORY OF PRESENT ILLNESS
[FreeTextEntry1] : The patient is a pleasant 56 year old male diagnosed with metastatic rectal cancer. He was diagnosed with Stage IV sigmoid/rectal cancer in 2019, June at age 54. Colonoscopy 6/28/2019 revealed multiple sessile polyps and a fungating, infiltrative large mass in the sigmoid colon. Pathology was positive for adenocarcinoma. CT showed nodules in lungs and liver lesions consistent with metastatic disease, the largest was 8 cm. [place] [facility] [place].  CT abdomen and pelvis on 8/3/21 demonstrated multiple pulmonary nodules comparing to previous scan in 2019 but overall interval decrease in size and number. Persistent multiple lesions of the liver with interval decrease in size and number. Mildly enlarged periportal lymph nodes without significant interval change. Circumferential wall thickening of the proximal to mid rectum consistent with known rectal carcinoma associated with apparent luminal narrowing. Soft tissue masses within the rectal mesocolon suspicious for local metastases or adenopathy, new since the prior examination. The overall caliber of the rectal mass appears somewhat decreased in transverse diameter compared with the prior CT of 2019. Mild improvement in previously noted enlarged lymph nodes in the retroperitoneum posterior to the head of the pancreas. \par  MRI rectum on 8/17/21  showed circumferential upper rectal tumor with invasion of the anterior peritoneal reflection, extramural vascular invasion, and bulky mesorectal and superior rectal lianet metastases. Indeterminate left common iliac node.  Anal canal involved. Suspicious extra-TME nodes present and indeterminate left common iliac node. He had a sigmoidoscopy performed on 8/17/21 which showed an ulcerated almost completely obstructing large mass in the sigmoid colon. The mass was circumferential measuring 8 cm in length (7 cm to 15 cm) and the scope could barely pass the mass.  He underwent a diverting colostomy by Dr. Novoa on 8/18/21. His pain is much improved and he is using Tylenol and motrin prn for pain at his tailbone. He has been receiving Irinotecan and Avastin [id number]. He presents to discuss the role of radiation therapy in his care.\par \par He presents for an OTV 5/5 fx. Feeling well. Only complaint is diffuse joint pain. Denies diarrhea or bleeding.

## 2021-11-10 PROBLEM — R09.81 SINUS CONGESTION: Status: ACTIVE | Noted: 2021-01-01

## 2021-11-10 PROBLEM — Z01.818 PREOP TESTING: Status: RESOLVED | Noted: 2021-01-01 | Resolved: 2021-01-01

## 2021-11-10 NOTE — PHYSICAL EXAM
[No Acute Distress] : no acute distress [Well Developed] : well developed [Normal Sclera/Conjunctiva] : normal sclera/conjunctiva [Normal Outer Ear/Nose] : the outer ears and nose were normal in appearance [Normal TMs] : both tympanic membranes were normal [Normal Nasal Mucosa] : the nasal mucosa was normal [No Respiratory Distress] : no respiratory distress  [No Accessory Muscle Use] : no accessory muscle use [Clear to Auscultation] : lungs were clear to auscultation bilaterally [Normal Rate] : normal rate  [Regular Rhythm] : with a regular rhythm [Normal S1, S2] : normal S1 and S2 [No Murmur] : no murmur heard [Coordination Grossly Intact] : coordination grossly intact [Normal Mood] : the mood was normal

## 2021-11-10 NOTE — REVIEW OF SYSTEMS
[Nasal Discharge] : nasal discharge [Headache] : headache [Negative] : Heme/Lymph [FreeTextEntry4] : sinus pressure

## 2021-11-10 NOTE — PLAN
[FreeTextEntry1] : his hypertension isn't under optimal control and he will continue with 10 mg of amlodipine daily and continue current doses of losartan and metoprolol ER, he will continue home BP monitoring, the hematology notes and lab reports were reviewed, ENT consultation advised and he was advised to follow up in the office in 7-10 days but to call early next week if his home BP readings haven't improved

## 2021-11-10 NOTE — HISTORY OF PRESENT ILLNESS
[FreeTextEntry1] : Pt presents for BP check. Pt states BP has been elevated at home.  [de-identified] : He is on Stivarga since last week.  He hadn't been checking his BP regularly but then he started to have headaches for the past few days and started checking his BP at home and it was 140/100 range.  He woke up with a headache this morning.  He took the extra 5 mg of amlodipine last night.  He was seen at [facility] in September for sinus congestion and was prescribed azelastine nasal spray which he is using along with the fluticasone and taking claritin daily but he still has pressure behind the right ear and in the right side of his face

## 2021-11-18 PROBLEM — K59.09 CHRONIC CONSTIPATION: Status: ACTIVE | Noted: 2021-01-01

## 2021-11-18 NOTE — PHYSICAL EXAM
[Restricted in physically strenuous activity but ambulatory and able to carry out work of a light or sedentary nature] : Status 1- Restricted in physically strenuous activity but ambulatory and able to carry out work of a light or sedentary nature, e.g., light house work, office work [Normal] : affect appropriate [de-identified] : intact colostomy left side abd

## 2021-11-18 NOTE — HISTORY OF PRESENT ILLNESS
[de-identified] : The patient was diagnosed with stage IV sigmoid / rectal cancer in June 2019 at the age of 54. He underwent a colonoscopy June 28, 2019 and multiple sessile polyps were found and a fungating and infiltrative partially obstructing large mass was found in the sigmoid colon from 15cm to 20cm, circumferential.  Pathology showed adenocarcinoma of colon. He was treated by Dr. Boston at [facility] [place] FOLFIRI +jenae (JENAE HELD 4/2021), last treated on 7/23/2021.  CT A/P on 08/3/21 showed multiple pulmonary nodules consistent with metastases with overall interval decrease in the number of nodules compared with prior CT of 2019.  Interval decrease in the number and size of metastatic hepatic lesions with persistent multiple lesions. Mildly enlarged periportal lymph nodes without significant interval change. Circumferential wall thickening of the proximal to mid rectum consistent with known rectal carcinoma associated with apparent luminal narrowing. Soft tissue masses within the rectal mesocolon suspicious for local metastases or adenopathy, new since the prior examination. The overall caliber of the rectal mass appears somewhat decreased in transverse diameter compared with the prior CT of 2019. Mild improvement in previously noted enlarged lymph nodes in the retroperitoneum posterior to the head of the pancreas. \par \par On 8/17/21 an MRI rectum showed circumferential upper rectal tumor with invasion of the anterior peritoneal reflection, extramural vascular invasion, and bulky mesorectal and superior rectal lianet metastases. Indeterminate left common iliac node.  Anal canal involved. Suspicious extra-TME nodes present: equicocal-indeterminate left common iliac node. On 8/17/21 a sigmoidoscopy was performed and an ulcerated almost completely obstructing large mass was found in the sigmoid colon. The mass was circumferential. The mass measured eight cm in length (7 cm to 15 cm). The gastroscope barely was able to traverse the mass.  He is s/p lap sigmoid colostomy for large bowel obstruction 8/18/21. \par  [de-identified] : PMH of HTN, DM and stage IV rectal cancer, with metastasis to the liver and lung\par Social hx: never smoker; rarely ETOH\par Family Hx: sister (teenager) sarcoma [de-identified] : Pt presents today s/p diverting colostomy. States after 5 treatments of radiation he is having "cancer sluffing of tissue from the rectum", creates a feeling constipation when this happens. No BRBPR. He started Stivarga (Regorafenib) 11/2/21 for 21 days out of 28 days, on 11/16/21 increased to 160mg (goal dose). His BP is slightly elevated today, when he monitors at home also elevated. He is having headaches with elevated BP, PCP doubled his Norvasc to 10mg. A few days later no improvement in readings, so his PCP increased losartan to 100mg, and last few days felt better. He is also watching salt intake. He has  soft stools in colostomy.  Fatigue mild changes. He denies mouth sores; voice changes; H/F syndrome; no grapefruit intake noted, dental work, he has none planned. He will have LFT's drawn q 2 weeks.

## 2021-11-18 NOTE — REVIEW OF SYSTEMS
[Negative] : Allergic/Immunologic [Fatigue] : fatigue [Constipation] : constipation [Vomiting] : no vomiting [Diarrhea] : no diarrhea [FreeTextEntry5] : headaches with elevated BP [FreeTextEntry7] : intermittent from rectum / sluffing

## 2021-11-19 NOTE — PHYSICAL EXAM
[No Acute Distress] : no acute distress [Well Nourished] : well nourished [Well Developed] : well developed [Well-Appearing] : well-appearing [Normal Voice/Communication] : normal voice/communication [No Respiratory Distress] : no respiratory distress  [No Accessory Muscle Use] : no accessory muscle use [Clear to Auscultation] : lungs were clear to auscultation bilaterally [Normal Rate] : normal rate  [Regular Rhythm] : with a regular rhythm [Normal S1, S2] : normal S1 and S2 [Normal Mood] : the mood was normal

## 2021-11-19 NOTE — HISTORY OF PRESENT ILLNESS
[FreeTextEntry1] : patient presents for follow-up  [de-identified] : His BP last night was 168/113 at home.  His BP's tend to be higher in the evening.  Since increasing the losartan he has been feeling a little better.  He saw the oncologist on 11/17 and no changes were made.

## 2021-11-19 NOTE — PLAN
[FreeTextEntry1] : the oncology note was reviewed, he was advised to continue current doses of losartan and amlodipine and to increase the metoprolol ER to 75 mg daily and to follow up in 2 weeks and to bring his BP monitor with him

## 2021-12-08 NOTE — HISTORY OF PRESENT ILLNESS
[de-identified] : The patient was diagnosed with stage IV sigmoid / rectal cancer in June 2019 at the age of 54. He underwent a colonoscopy June 28, 2019 and multiple sessile polyps were found and a fungating and infiltrative partially obstructing large mass was found in the sigmoid colon from 15cm to 20cm, circumferential.  Pathology showed adenocarcinoma of colon. He was treated by Dr. Boston at [facility] on FOLFIRI +jenae (JENAE HELD 4/2021), last treated on 7/23/2021.  CT A/P on 08/3/21 showed multiple pulmonary nodules consistent with metastases with overall interval decrease in the number of nodules compared with prior CT of 2019.  Interval decrease in the number and size of metastatic hepatic lesions with persistent multiple lesions. Mildly enlarged periportal lymph nodes without significant interval change. Circumferential wall thickening of the proximal to mid rectum consistent with known rectal carcinoma associated with apparent luminal narrowing. Soft tissue masses within the rectal mesocolon suspicious for local metastases or adenopathy, new since the prior examination. The overall caliber of the rectal mass appears somewhat decreased in transverse diameter compared with the prior CT of 2019. Mild improvement in previously noted enlarged lymph nodes in the retroperitoneum posterior to the head of the pancreas. \par \par On 8/17/21 an MRI rectum showed circumferential upper rectal tumor with invasion of the anterior peritoneal reflection, extramural vascular invasion, and bulky mesorectal and superior rectal lianet metastases. Indeterminate left common iliac node.  Anal canal involved. Suspicious extra-TME nodes present: equicocal-indeterminate left common iliac node. On 8/17/21 a sigmoidoscopy was performed and an ulcerated almost completely obstructing large mass was found in the sigmoid colon. The mass was circumferential. The mass measured eight cm in length (7 cm to 15 cm). The gastroscope barely was able to traverse the mass.  He is s/p lap sigmoid colostomy for large bowel obstruction 8/18/21. \par  [de-identified] : PMH of HTN, DM and stage IV rectal cancer, with metastasis to the liver and lung\par Social hx: never smoker; rarely ETOH\par Family Hx: sister (teenager) sarcoma [de-identified] : Pt presents today s/p diverting colostomy. States he continues having "cancer sluffing of tissue from the rectum", creates a feeling constipation when this happens, and feels relief after "sluffing BM". No BRBPR. He started Stivarga (Regorafenib) 11/2/21 for 21 days out of 28 days, on 11/16/21 increased to 160mg (goal dose), C2D7 today. C/o freckled H/F and cracked open hands, applying lotion daily and is resolved with week off chemo. His BP is slightly elevated today, when he monitors at home also elevated. His headaches are  improved PCP doubled his Norvasc to 10mg, doubled losartan to 100mg and metoprolol to 75mg. Continues to watch his salt intake. He has  soft stools in colostomy. He denies mouth sores; + voice changes, stable; no grapefruit intake noted, dental work, he has none planned. He complains that his joint pain has returned, however he is now packing up his house and having more activity than normal. He also has an increase in fatigue, but doesn't feel its related to activity changes. Feels well overall.

## 2021-12-08 NOTE — REVIEW OF SYSTEMS
[Fatigue] : fatigue [Constipation] : constipation [Negative] : Allergic/Immunologic [Recent Change In Weight] : ~T no recent weight change [Vomiting] : no vomiting [Diarrhea] : no diarrhea [FreeTextEntry5] : headaches with elevated BP [FreeTextEntry7] : intermittent from rectum / sluffing

## 2021-12-08 NOTE — RESULTS/DATA
[FreeTextEntry1] : 10/13/2021 CT: Numerous pulmonary parenchymal nodules throughout both lungs.  The largest lung masses in the posterior aspect of the right upper lobe measures 2.4 x 2.3 cm.  Nodules at the bases of the lungs on previous CT are unchanged.  Multiple hypoechoic liver masses.  The largest mass in the posterior aspect of the dome of the right hepatic lobe measures 8.8 x 7.9 cm, up from 8.1 x 6.2 cm previously.  The remaining hepatic lesions appear increased in size and number compared with the previous study as well.  Area of masslike thickening in the distal sigmoid colon and rectum relative to previous exam.  An adjacent pericolonic mass measures 2.8 x 3.1 cm, compatible with metastatic perirectal lymph node.\par \par 8/17/21 Sigmoidoscopy:  An ulcerated almost completely obstructing large mass was found in the sigmoid colon. The mass was circumferential. The mass measured eight cm \par in length (7 cm to 15 cm). Oozing was present. The gastroscope (9.9 mm) barely was able to traverse the mass.  Area at the distal side was successfully injected with 3 mL Spot (carbon black) for tattooing.\par Impression: No specimens collected.\par \par 8/17/21 MRI Rectum: Circumferential upper rectal tumor with invasion of the anterior peritoneal reflection, extramural vascular invasion, and bulky mesorectal and superior rectal liaent metastases. Indeterminate left common iliac node. If anal canal involved Anal canal involved. Suspicious extra-TME nodes present: Equicocal-indeterminate left common iliac node. \par \par 8/17/21 Colonoscopy:  no specimens collected; impending obstruction. \par \par 8/3/21 CT A/P: Multiple pulmonary nodules consistent with metastases with overall interval decrease in the number of nodules compared with prior CT of 2019.   Interval decrease in the number and size of metastatic hepatic lesions with persistent multiple lesions as described. Mildly enlarged periportal lymph nodes without significant interval change. Circumferential wall thickening of the proximal to mid rectum consistent with known rectal carcinoma associated with apparent luminal narrowing. Soft tissue masses within the rectal mesocolon suspicious for local metastases or adenopathy, new since the prior examination. The overall caliber of the rectal mass appears somewhat decreased in transverse diameter compared with the prior CT of 2019. Mild improvement in previously noted enlarged lymph nodes in the retroperitoneum posterior to the head of the pancreas. \par \par 6/28/19 Path: Sigmoid:  Adenocarcinoma of colon, moderately to poorly differentiated, invasive. \par \par 6/28/19 Colonoscopy: A 10mm polyp was found in the ascending colon, polyp was sessile. A 10mm polyp was found in the hepatic flexure, polyp was sessile. A 5mm polyp was found in the proximal ascending colon, polyp was sessile. Three sessile polyps were found in the distal transverse colon, all 10mm in size. Non-bleeding hemorrhoids were found during retroflexion. The hemorrhoids were Grade 1. 2 sessile polyps were found in the sigmoid colon, both 10mm in size. A fungating and infiltrative partially obstructing large mass was was found in the sigmoid colon from 15cm to 20cm. The mass was partially circumferential. The mass measured 5cm in length, Oozing was present. Area was tattooed with an injection of 3ml of kenyetta ink at both the proximal and distal aspect of the mass.

## 2021-12-08 NOTE — PHYSICAL EXAM
[Restricted in physically strenuous activity but ambulatory and able to carry out work of a light or sedentary nature] : Status 1- Restricted in physically strenuous activity but ambulatory and able to carry out work of a light or sedentary nature, e.g., light house work, office work [Normal] : affect appropriate [de-identified] : intact colostomy left side abd

## 2021-12-10 PROBLEM — Z00.00 ENCOUNTER FOR PREVENTIVE HEALTH EXAMINATION: Noted: 2021-01-01

## 2021-12-16 NOTE — HISTORY OF PRESENT ILLNESS
[de-identified] : The patient was diagnosed with stage IV sigmoid / rectal cancer in June 2019 at the age of 54. He underwent a colonoscopy June 28, 2019 and multiple sessile polyps were found and a fungating and infiltrative partially obstructing large mass was found in the sigmoid colon from 15cm to 20cm, circumferential.  Pathology showed adenocarcinoma of colon. He was treated by Dr. Boston at [facility] on FOLFIRI +jenae (JENAE HELD 4/2021), last treated on 7/23/2021.  CT A/P on 08/3/21 showed multiple pulmonary nodules consistent with metastases with overall interval decrease in the number of nodules compared with prior CT of 2019.  Interval decrease in the number and size of metastatic hepatic lesions with persistent multiple lesions. Mildly enlarged periportal lymph nodes without significant interval change. Circumferential wall thickening of the proximal to mid rectum consistent with known rectal carcinoma associated with apparent luminal narrowing. Soft tissue masses within the rectal mesocolon suspicious for local metastases or adenopathy, new since the prior examination. The overall caliber of the rectal mass appears somewhat decreased in transverse diameter compared with the prior CT of 2019. Mild improvement in previously noted enlarged lymph nodes in the retroperitoneum posterior to the head of the pancreas. \par \par On 8/17/21 an MRI rectum showed circumferential upper rectal tumor with invasion of the anterior peritoneal reflection, extramural vascular invasion, and bulky mesorectal and superior rectal lianet metastases. Indeterminate left common iliac node.  Anal canal involved. Suspicious extra-TME nodes present: equicocal-indeterminate left common iliac node. On 8/17/21 a sigmoidoscopy was performed and an ulcerated almost completely obstructing large mass was found in the sigmoid colon. The mass was circumferential. The mass measured eight cm in length (7 cm to 15 cm). The gastroscope barely was able to traverse the mass.  He is s/p lap sigmoid colostomy for large bowel obstruction 8/18/21. \par  [de-identified] : PMH of HTN, DM and stage IV rectal cancer, with metastasis to the liver and lung\par Social hx: never smoker; rarely ETOH\par Family Hx: sister (teenager) sarcoma [de-identified] : Pt presents today s/p diverting colostomy. States he continues having "cancer slloughing of tissue from the rectum", creates a feeling constipation when this happens, and feels relief after "sluffing BM". No BRBPR. He started Stivarga (Regorafenib) 11/2/21 for 21 days out of 28 days, on 11/16/21 increased to 160mg (goal dose). C/o freckled H/F and cracked open hands, applying lotion daily and is resolved with week off chemo. His BP is slightly elevated today, when he monitors at home also elevated. His headaches are  improved PCP doubled his Norvasc to 10mg, doubled losartan to 100mg and metoprolol to 75mg. Continues to watch his salt intake. He has  soft stools in colostomy. He denies mouth sores; + voice changes, stable; no grapefruit intake noted, dental work, he has none planned. He complains that his joint pain has returned, however he is now packing up his house and having more activity than normal. He also has an increase in fatigue, but doesn't feel its related to activity changes. Feels well overall.

## 2021-12-16 NOTE — PHYSICAL EXAM
[Restricted in physically strenuous activity but ambulatory and able to carry out work of a light or sedentary nature] : Status 1- Restricted in physically strenuous activity but ambulatory and able to carry out work of a light or sedentary nature, e.g., light house work, office work [Normal] : affect appropriate [de-identified] : intact colostomy left side abd

## 2022-01-01 ENCOUNTER — APPOINTMENT (OUTPATIENT)
Dept: HEMATOLOGY ONCOLOGY | Facility: CLINIC | Age: 57
End: 2022-01-01
Payer: COMMERCIAL

## 2022-01-01 ENCOUNTER — APPOINTMENT (OUTPATIENT)
Dept: ULTRASOUND IMAGING | Facility: CLINIC | Age: 57
End: 2022-01-01
Payer: COMMERCIAL

## 2022-01-01 ENCOUNTER — RESULT REVIEW (OUTPATIENT)
Age: 57
End: 2022-01-01

## 2022-01-01 ENCOUNTER — NON-APPOINTMENT (OUTPATIENT)
Age: 57
End: 2022-01-01

## 2022-01-01 ENCOUNTER — APPOINTMENT (OUTPATIENT)
Dept: HEMATOLOGY ONCOLOGY | Facility: CLINIC | Age: 57
End: 2022-01-01

## 2022-01-01 ENCOUNTER — APPOINTMENT (OUTPATIENT)
Dept: INFUSION THERAPY | Facility: CLINIC | Age: 57
End: 2022-01-01

## 2022-01-01 ENCOUNTER — APPOINTMENT (OUTPATIENT)
Dept: CT IMAGING | Facility: CLINIC | Age: 57
End: 2022-01-01
Payer: COMMERCIAL

## 2022-01-01 ENCOUNTER — APPOINTMENT (OUTPATIENT)
Dept: RADIATION ONCOLOGY | Facility: CLINIC | Age: 57
End: 2022-01-01
Payer: COMMERCIAL

## 2022-01-01 ENCOUNTER — APPOINTMENT (OUTPATIENT)
Dept: PHYSICAL MEDICINE AND REHAB | Facility: CLINIC | Age: 57
End: 2022-01-01
Payer: COMMERCIAL

## 2022-01-01 ENCOUNTER — OUTPATIENT (OUTPATIENT)
Dept: OUTPATIENT SERVICES | Facility: HOSPITAL | Age: 57
LOS: 1 days | End: 2022-01-01
Payer: COMMERCIAL

## 2022-01-01 ENCOUNTER — OUTPATIENT (OUTPATIENT)
Dept: OUTPATIENT SERVICES | Facility: HOSPITAL | Age: 57
LOS: 1 days | Discharge: HOME | End: 2022-01-01

## 2022-01-01 ENCOUNTER — TRANSCRIPTION ENCOUNTER (OUTPATIENT)
Age: 57
End: 2022-01-01

## 2022-01-01 ENCOUNTER — APPOINTMENT (OUTPATIENT)
Dept: FAMILY MEDICINE | Facility: CLINIC | Age: 57
End: 2022-01-01

## 2022-01-01 ENCOUNTER — APPOINTMENT (OUTPATIENT)
Dept: FAMILY MEDICINE | Facility: CLINIC | Age: 57
End: 2022-01-01
Payer: COMMERCIAL

## 2022-01-01 ENCOUNTER — APPOINTMENT (OUTPATIENT)
Dept: RADIATION ONCOLOGY | Facility: CLINIC | Age: 57
End: 2022-01-01

## 2022-01-01 ENCOUNTER — APPOINTMENT (OUTPATIENT)
Dept: OTOLARYNGOLOGY | Facility: CLINIC | Age: 57
End: 2022-01-01
Payer: COMMERCIAL

## 2022-01-01 VITALS
SYSTOLIC BLOOD PRESSURE: 111 MMHG | OXYGEN SATURATION: 98 % | HEART RATE: 92 BPM | BODY MASS INDEX: 27.63 KG/M2 | WEIGHT: 182.31 LBS | HEIGHT: 68 IN | RESPIRATION RATE: 17 BRPM | DIASTOLIC BLOOD PRESSURE: 76 MMHG | TEMPERATURE: 97.9 F

## 2022-01-01 VITALS
HEART RATE: 83 BPM | RESPIRATION RATE: 17 BRPM | TEMPERATURE: 97.5 F | SYSTOLIC BLOOD PRESSURE: 97 MMHG | WEIGHT: 193 LBS | OXYGEN SATURATION: 100 % | DIASTOLIC BLOOD PRESSURE: 64 MMHG | HEIGHT: 68 IN | BODY MASS INDEX: 29.25 KG/M2

## 2022-01-01 VITALS — BODY MASS INDEX: 28.49 KG/M2 | HEIGHT: 68 IN | TEMPERATURE: 98 F | WEIGHT: 188 LBS

## 2022-01-01 VITALS
HEART RATE: 92 BPM | TEMPERATURE: 97.2 F | SYSTOLIC BLOOD PRESSURE: 87 MMHG | DIASTOLIC BLOOD PRESSURE: 52 MMHG | OXYGEN SATURATION: 100 % | RESPIRATION RATE: 18 BRPM

## 2022-01-01 VITALS
SYSTOLIC BLOOD PRESSURE: 130 MMHG | HEIGHT: 68 IN | DIASTOLIC BLOOD PRESSURE: 80 MMHG | WEIGHT: 188 LBS | HEART RATE: 95 BPM | RESPIRATION RATE: 16 BRPM | BODY MASS INDEX: 28.49 KG/M2 | OXYGEN SATURATION: 98 %

## 2022-01-01 VITALS — SYSTOLIC BLOOD PRESSURE: 118 MMHG | DIASTOLIC BLOOD PRESSURE: 72 MMHG

## 2022-01-01 VITALS — TEMPERATURE: 97.5 F

## 2022-01-01 VITALS — WEIGHT: 187.83 LBS

## 2022-01-01 DIAGNOSIS — C18.9 MALIGNANT NEOPLASM OF COLON, UNSPECIFIED: ICD-10-CM

## 2022-01-01 DIAGNOSIS — Z74.09 OTHER REDUCED MOBILITY: ICD-10-CM

## 2022-01-01 DIAGNOSIS — C18.7 MALIGNANT NEOPLASM OF SIGMOID COLON: ICD-10-CM

## 2022-01-01 DIAGNOSIS — G50.1 ATYPICAL FACIAL PAIN: ICD-10-CM

## 2022-01-01 DIAGNOSIS — Z93.3 COLOSTOMY STATUS: ICD-10-CM

## 2022-01-01 DIAGNOSIS — C79.9 SECONDARY MALIGNANT NEOPLASM OF UNSPECIFIED SITE: ICD-10-CM

## 2022-01-01 DIAGNOSIS — J31.0 CHRONIC RHINITIS: ICD-10-CM

## 2022-01-01 DIAGNOSIS — K92.1 MELENA: ICD-10-CM

## 2022-01-01 DIAGNOSIS — Z98.890 OTHER SPECIFIED POSTPROCEDURAL STATES: Chronic | ICD-10-CM

## 2022-01-01 DIAGNOSIS — M25.50 PAIN IN UNSPECIFIED JOINT: ICD-10-CM

## 2022-01-01 DIAGNOSIS — C20 SECONDARY MALIGNANT NEOPLASM OF UNSPECIFIED SITE: ICD-10-CM

## 2022-01-01 DIAGNOSIS — J32.9 CHRONIC SINUSITIS, UNSPECIFIED: ICD-10-CM

## 2022-01-01 DIAGNOSIS — M79.2 NEURALGIA AND NEURITIS, UNSPECIFIED: ICD-10-CM

## 2022-01-01 DIAGNOSIS — C20 MALIGNANT NEOPLASM OF RECTUM: ICD-10-CM

## 2022-01-01 DIAGNOSIS — R09.82 POSTNASAL DRIP: ICD-10-CM

## 2022-01-01 DIAGNOSIS — K21.9 GASTRO-ESOPHAGEAL REFLUX DISEASE W/OUT ESOPHAGITIS: ICD-10-CM

## 2022-01-01 DIAGNOSIS — I10 ESSENTIAL (PRIMARY) HYPERTENSION: ICD-10-CM

## 2022-01-01 DIAGNOSIS — Z78.9 OTHER REDUCED MOBILITY: ICD-10-CM

## 2022-01-01 DIAGNOSIS — J30.1 ALLERGIC RHINITIS DUE TO POLLEN: ICD-10-CM

## 2022-01-01 DIAGNOSIS — R52 PAIN, UNSPECIFIED: ICD-10-CM

## 2022-01-01 DIAGNOSIS — G89.3 NEOPLASM RELATED PAIN (ACUTE) (CHRONIC): ICD-10-CM

## 2022-01-01 DIAGNOSIS — R11.2 NAUSEA WITH VOMITING, UNSPECIFIED: ICD-10-CM

## 2022-01-01 DIAGNOSIS — Z51.11 ENCOUNTER FOR ANTINEOPLASTIC CHEMOTHERAPY: ICD-10-CM

## 2022-01-01 DIAGNOSIS — Z86.39 PERSONAL HISTORY OF OTHER ENDOCRINE, NUTRITIONAL AND METABOLIC DISEASE: ICD-10-CM

## 2022-01-01 LAB
ALBUMIN SERPL ELPH-MCNC: 2.6 G/DL
ALBUMIN SERPL ELPH-MCNC: 2.8 G/DL
ALBUMIN SERPL ELPH-MCNC: 3.3 G/DL — SIGNIFICANT CHANGE UP (ref 3.3–5)
ALBUMIN SERPL ELPH-MCNC: 3.9 G/DL
ALP BLD-CCNC: 203 U/L
ALP BLD-CCNC: 331 U/L
ALP BLD-CCNC: 350 U/L
ALP SERPL-CCNC: 365 U/L — HIGH (ref 40–120)
ALT FLD-CCNC: 36 U/L — SIGNIFICANT CHANGE UP (ref 10–45)
ALT SERPL-CCNC: 27 U/L
ALT SERPL-CCNC: 38 U/L
ALT SERPL-CCNC: 39 U/L
ANION GAP SERPL CALC-SCNC: 10 MMOL/L
ANION GAP SERPL CALC-SCNC: 12 MMOL/L — SIGNIFICANT CHANGE UP (ref 5–17)
ANION GAP SERPL CALC-SCNC: 13 MMOL/L
ANION GAP SERPL CALC-SCNC: 15 MMOL/L
AST SERPL-CCNC: 117 U/L
AST SERPL-CCNC: 46 U/L
AST SERPL-CCNC: 63 U/L — HIGH (ref 10–40)
AST SERPL-CCNC: 90 U/L
BASOPHILS # BLD AUTO: 0.01 K/UL — SIGNIFICANT CHANGE UP (ref 0–0.2)
BASOPHILS # BLD AUTO: 0.02 K/UL
BASOPHILS # BLD AUTO: 0.03 K/UL
BASOPHILS # BLD AUTO: 0.03 K/UL — SIGNIFICANT CHANGE UP (ref 0–0.2)
BASOPHILS # BLD AUTO: 0.06 K/UL
BASOPHILS NFR BLD AUTO: 0.1 % — SIGNIFICANT CHANGE UP (ref 0–2)
BASOPHILS NFR BLD AUTO: 0.3 %
BASOPHILS NFR BLD AUTO: 0.4 %
BASOPHILS NFR BLD AUTO: 0.4 % — SIGNIFICANT CHANGE UP (ref 0–2)
BASOPHILS NFR BLD AUTO: 0.9 %
BILIRUB SERPL-MCNC: 1 MG/DL
BILIRUB SERPL-MCNC: 1.8 MG/DL — HIGH (ref 0.2–1.2)
BILIRUB SERPL-MCNC: 13.6 MG/DL
BILIRUB SERPL-MCNC: 4.8 MG/DL
BUN SERPL-MCNC: 12 MG/DL — SIGNIFICANT CHANGE UP (ref 7–23)
BUN SERPL-MCNC: 14 MG/DL
BUN SERPL-MCNC: 15 MG/DL
BUN SERPL-MCNC: 26 MG/DL
CALCIUM SERPL-MCNC: 8.2 MG/DL
CALCIUM SERPL-MCNC: 8.5 MG/DL
CALCIUM SERPL-MCNC: 8.5 MG/DL — SIGNIFICANT CHANGE UP (ref 8.4–10.5)
CALCIUM SERPL-MCNC: 8.8 MG/DL
CEA SERPL-MCNC: 432 NG/ML — HIGH (ref 0–3.8)
CEA SERPL-MCNC: 899 NG/ML — HIGH (ref 0–3.8)
CHLORIDE SERPL-SCNC: 100 MMOL/L
CHLORIDE SERPL-SCNC: 100 MMOL/L — SIGNIFICANT CHANGE UP (ref 96–108)
CHLORIDE SERPL-SCNC: 93 MMOL/L
CHLORIDE SERPL-SCNC: 94 MMOL/L
CO2 SERPL-SCNC: 19 MMOL/L
CO2 SERPL-SCNC: 24 MMOL/L
CO2 SERPL-SCNC: 24 MMOL/L — SIGNIFICANT CHANGE UP (ref 22–31)
CO2 SERPL-SCNC: 25 MMOL/L
CREAT SERPL-MCNC: 0.82 MG/DL — SIGNIFICANT CHANGE UP (ref 0.5–1.3)
CREAT SERPL-MCNC: 0.86 MG/DL
CREAT SERPL-MCNC: 1.08 MG/DL
CREAT SERPL-MCNC: 1.38 MG/DL
EGFR: 102 ML/MIN/1.73M2
EGFR: 103 ML/MIN/1.73M2 — SIGNIFICANT CHANGE UP
EGFR: 60 ML/MIN/1.73M2
EOSINOPHIL # BLD AUTO: 0.02 K/UL — SIGNIFICANT CHANGE UP (ref 0–0.5)
EOSINOPHIL # BLD AUTO: 0.04 K/UL
EOSINOPHIL # BLD AUTO: 0.1 K/UL
EOSINOPHIL # BLD AUTO: 0.16 K/UL — SIGNIFICANT CHANGE UP (ref 0–0.5)
EOSINOPHIL # BLD AUTO: 0.4 K/UL
EOSINOPHIL NFR BLD AUTO: 0.2 % — SIGNIFICANT CHANGE UP (ref 0–6)
EOSINOPHIL NFR BLD AUTO: 0.6 %
EOSINOPHIL NFR BLD AUTO: 1.2 %
EOSINOPHIL NFR BLD AUTO: 2.2 % — SIGNIFICANT CHANGE UP (ref 0–6)
EOSINOPHIL NFR BLD AUTO: 6 %
GLUCOSE SERPL-MCNC: 111 MG/DL
GLUCOSE SERPL-MCNC: 111 MG/DL
GLUCOSE SERPL-MCNC: 115 MG/DL
GLUCOSE SERPL-MCNC: 120 MG/DL — HIGH (ref 70–99)
HAV IGM SER QL: NONREACTIVE
HBV CORE IGM SER QL: NONREACTIVE
HBV SURFACE AG SER QL: NONREACTIVE
HCT VFR BLD CALC: 22.6 % — LOW (ref 39–50)
HCT VFR BLD CALC: 28.8 %
HCT VFR BLD CALC: 30.1 %
HCT VFR BLD CALC: 32.7 % — LOW (ref 39–50)
HCT VFR BLD CALC: 41.7 %
HCV AB SER QL: NONREACTIVE
HCV S/CO RATIO: 0.17 S/CO
HGB BLD-MCNC: 10.4 G/DL — LOW (ref 13–17)
HGB BLD-MCNC: 13.4 G/DL
HGB BLD-MCNC: 7.4 G/DL — LOW (ref 13–17)
HGB BLD-MCNC: 8.8 G/DL
HGB BLD-MCNC: 9.6 G/DL
IMM GRANULOCYTES NFR BLD AUTO: 0.4 % — SIGNIFICANT CHANGE UP (ref 0–1.5)
IMM GRANULOCYTES NFR BLD AUTO: 0.5 %
IMM GRANULOCYTES NFR BLD AUTO: 0.6 % — SIGNIFICANT CHANGE UP (ref 0–1.5)
IMM GRANULOCYTES NFR BLD AUTO: 1 %
IMM GRANULOCYTES NFR BLD AUTO: 1.4 %
LYMPHOCYTES # BLD AUTO: 0.12 K/UL — LOW (ref 1–3.3)
LYMPHOCYTES # BLD AUTO: 0.5 K/UL
LYMPHOCYTES # BLD AUTO: 0.51 K/UL
LYMPHOCYTES # BLD AUTO: 0.56 K/UL — LOW (ref 1–3.3)
LYMPHOCYTES # BLD AUTO: 1.03 K/UL
LYMPHOCYTES # BLD AUTO: 1.1 % — LOW (ref 13–44)
LYMPHOCYTES # BLD AUTO: 7.6 % — LOW (ref 13–44)
LYMPHOCYTES NFR BLD AUTO: 15.5 %
LYMPHOCYTES NFR BLD AUTO: 6.2 %
LYMPHOCYTES NFR BLD AUTO: 7.5 %
MAGNESIUM SERPL-MCNC: 1.8 MG/DL — SIGNIFICANT CHANGE UP (ref 1.6–2.6)
MAGNESIUM SERPL-MCNC: 2.1 MG/DL
MAN DIFF?: NORMAL
MCHC RBC-ENTMCNC: 28 PG — SIGNIFICANT CHANGE UP (ref 27–34)
MCHC RBC-ENTMCNC: 28.2 PG
MCHC RBC-ENTMCNC: 28.9 PG
MCHC RBC-ENTMCNC: 29.7 PG
MCHC RBC-ENTMCNC: 30.1 PG — SIGNIFICANT CHANGE UP (ref 27–34)
MCHC RBC-ENTMCNC: 30.6 GM/DL
MCHC RBC-ENTMCNC: 31.8 GM/DL — LOW (ref 32–36)
MCHC RBC-ENTMCNC: 31.9 GM/DL
MCHC RBC-ENTMCNC: 32.1 GM/DL
MCHC RBC-ENTMCNC: 32.7 GM/DL — SIGNIFICANT CHANGE UP (ref 32–36)
MCV RBC AUTO: 87.9 FL — SIGNIFICANT CHANGE UP (ref 80–100)
MCV RBC AUTO: 88.5 FL
MCV RBC AUTO: 90.1 FL
MCV RBC AUTO: 91.9 FL — SIGNIFICANT CHANGE UP (ref 80–100)
MCV RBC AUTO: 97.3 FL
MONOCYTES # BLD AUTO: 0.1 K/UL — SIGNIFICANT CHANGE UP (ref 0–0.9)
MONOCYTES # BLD AUTO: 0.24 K/UL
MONOCYTES # BLD AUTO: 0.72 K/UL
MONOCYTES # BLD AUTO: 0.74 K/UL — SIGNIFICANT CHANGE UP (ref 0–0.9)
MONOCYTES # BLD AUTO: 0.89 K/UL
MONOCYTES NFR BLD AUTO: 0.9 % — LOW (ref 2–14)
MONOCYTES NFR BLD AUTO: 10.1 % — SIGNIFICANT CHANGE UP (ref 2–14)
MONOCYTES NFR BLD AUTO: 10.8 %
MONOCYTES NFR BLD AUTO: 13.4 %
MONOCYTES NFR BLD AUTO: 2.9 %
NEUTROPHILS # BLD AUTO: 10.48 K/UL — HIGH (ref 1.8–7.4)
NEUTROPHILS # BLD AUTO: 4.42 K/UL
NEUTROPHILS # BLD AUTO: 5.1 K/UL
NEUTROPHILS # BLD AUTO: 5.83 K/UL — SIGNIFICANT CHANGE UP (ref 1.8–7.4)
NEUTROPHILS # BLD AUTO: 7.21 K/UL
NEUTROPHILS NFR BLD AUTO: 66.3 %
NEUTROPHILS NFR BLD AUTO: 76.8 %
NEUTROPHILS NFR BLD AUTO: 79.3 % — HIGH (ref 43–77)
NEUTROPHILS NFR BLD AUTO: 88.3 %
NEUTROPHILS NFR BLD AUTO: 97.1 % — HIGH (ref 43–77)
NRBC # BLD: 0 /100 WBCS — SIGNIFICANT CHANGE UP (ref 0–0)
NRBC # BLD: 0 /100 WBCS — SIGNIFICANT CHANGE UP (ref 0–0)
PLATELET # BLD AUTO: 149 K/UL — LOW (ref 150–400)
PLATELET # BLD AUTO: 169 K/UL — SIGNIFICANT CHANGE UP (ref 150–400)
PLATELET # BLD AUTO: 185 K/UL
PLATELET # BLD AUTO: 190 K/UL
PLATELET # BLD AUTO: 233 K/UL
POTASSIUM SERPL-MCNC: 3.6 MMOL/L — SIGNIFICANT CHANGE UP (ref 3.5–5.3)
POTASSIUM SERPL-SCNC: 3.6 MMOL/L — SIGNIFICANT CHANGE UP (ref 3.5–5.3)
POTASSIUM SERPL-SCNC: 3.7 MMOL/L
POTASSIUM SERPL-SCNC: 4.3 MMOL/L
POTASSIUM SERPL-SCNC: 4.5 MMOL/L
PROT SERPL-MCNC: 6.4 G/DL
PROT SERPL-MCNC: 6.6 G/DL
PROT SERPL-MCNC: 7.1 G/DL — SIGNIFICANT CHANGE UP (ref 6–8.3)
PROT SERPL-MCNC: 7.5 G/DL
RBC # BLD: 2.46 M/UL — LOW (ref 4.2–5.8)
RBC # BLD: 2.96 M/UL
RBC # BLD: 3.4 M/UL
RBC # BLD: 3.72 M/UL — LOW (ref 4.2–5.8)
RBC # BLD: 4.63 M/UL
RBC # FLD: 14.4 %
RBC # FLD: 15.8 % — HIGH (ref 10.3–14.5)
RBC # FLD: 16.6 %
RBC # FLD: 22.7 % — HIGH (ref 10.3–14.5)
RBC # FLD: 24.2 %
SODIUM SERPL-SCNC: 127 MMOL/L
SODIUM SERPL-SCNC: 128 MMOL/L
SODIUM SERPL-SCNC: 135 MMOL/L — SIGNIFICANT CHANGE UP (ref 135–145)
SODIUM SERPL-SCNC: 138 MMOL/L
WBC # BLD: 10.79 K/UL — HIGH (ref 3.8–10.5)
WBC # BLD: 7.35 K/UL — SIGNIFICANT CHANGE UP (ref 3.8–10.5)
WBC # FLD AUTO: 10.79 K/UL — HIGH (ref 3.8–10.5)
WBC # FLD AUTO: 6.64 K/UL
WBC # FLD AUTO: 6.66 K/UL
WBC # FLD AUTO: 7.35 K/UL — SIGNIFICANT CHANGE UP (ref 3.8–10.5)
WBC # FLD AUTO: 8.17 K/UL

## 2022-01-01 PROCEDURE — 99214 OFFICE O/P EST MOD 30 MIN: CPT

## 2022-01-01 PROCEDURE — 71260 CT THORAX DX C+: CPT

## 2022-01-01 PROCEDURE — 93971 EXTREMITY STUDY: CPT | Mod: 26,LT

## 2022-01-01 PROCEDURE — 99215 OFFICE O/P EST HI 40 MIN: CPT

## 2022-01-01 PROCEDURE — 71260 CT THORAX DX C+: CPT | Mod: 26

## 2022-01-01 PROCEDURE — 74177 CT ABD & PELVIS W/CONTRAST: CPT

## 2022-01-01 PROCEDURE — 99024 POSTOP FOLLOW-UP VISIT: CPT

## 2022-01-01 PROCEDURE — 99215 OFFICE O/P EST HI 40 MIN: CPT | Mod: 95

## 2022-01-01 PROCEDURE — 99214 OFFICE O/P EST MOD 30 MIN: CPT | Mod: 95

## 2022-01-01 PROCEDURE — 93971 EXTREMITY STUDY: CPT

## 2022-01-01 PROCEDURE — 74177 CT ABD & PELVIS W/CONTRAST: CPT | Mod: 26

## 2022-01-01 PROCEDURE — 99203 OFFICE O/P NEW LOW 30 MIN: CPT | Mod: 25

## 2022-01-01 PROCEDURE — 82565 ASSAY OF CREATININE: CPT

## 2022-01-01 PROCEDURE — 31231 NASAL ENDOSCOPY DX: CPT

## 2022-01-01 PROCEDURE — 99204 OFFICE O/P NEW MOD 45 MIN: CPT

## 2022-01-01 RX ORDER — LOSARTAN POTASSIUM 100 MG/1
100 TABLET, FILM COATED ORAL
Qty: 90 | Refills: 3 | Status: ACTIVE | COMMUNITY
Start: 1900-01-01 | End: 1900-01-01

## 2022-01-01 RX ORDER — TRIFLURIDINE AND TIPIRACIL 20; 8.19 MG/1; MG/1
20-8.19 TABLET, FILM COATED ORAL
Qty: 70 | Refills: 5 | Status: ACTIVE | COMMUNITY
Start: 2022-01-01 | End: 1900-01-01

## 2022-01-01 RX ORDER — AMLODIPINE BESYLATE 2.5 MG/1
1 TABLET ORAL
Qty: 0 | Refills: 0 | DISCHARGE

## 2022-01-01 RX ORDER — OMEPRAZOLE 10 MG/1
1 CAPSULE, DELAYED RELEASE ORAL
Qty: 0 | Refills: 0 | DISCHARGE

## 2022-01-01 RX ORDER — SILDENAFIL 100 MG/1
100 TABLET, FILM COATED ORAL
Qty: 100 | Refills: 1 | Status: DISCONTINUED | COMMUNITY
Start: 2018-11-12 | End: 2022-01-01

## 2022-01-01 RX ORDER — LOSARTAN POTASSIUM 100 MG/1
1 TABLET, FILM COATED ORAL
Qty: 0 | Refills: 0 | DISCHARGE

## 2022-01-01 RX ORDER — INSULIN GLARGINE 100 [IU]/ML
6 INJECTION, SOLUTION SUBCUTANEOUS
Qty: 0 | Refills: 0 | DISCHARGE

## 2022-01-01 RX ORDER — OXYCODONE 5 MG/1
5 TABLET ORAL
Qty: 60 | Refills: 0 | Status: ACTIVE | COMMUNITY
Start: 2022-01-01 | End: 1900-01-01

## 2022-01-01 RX ORDER — FLUTICASONE PROPIONATE 50 UG/1
50 SPRAY, METERED NASAL DAILY
Qty: 1 | Refills: 5 | Status: ACTIVE | COMMUNITY
Start: 2022-01-01 | End: 1900-01-01

## 2022-01-01 RX ORDER — FLUTICASONE PROPIONATE 50 MCG
1 SPRAY, SUSPENSION NASAL
Qty: 0 | Refills: 0 | DISCHARGE

## 2022-01-01 RX ORDER — DIPHENHYDRAMINE HCL 2 %
25 CREAM (GRAM) TOPICAL AT BEDTIME
Refills: 0 | Status: ACTIVE | COMMUNITY

## 2022-01-01 RX ORDER — METFORMIN HYDROCHLORIDE 850 MG/1
1 TABLET ORAL
Qty: 0 | Refills: 0 | DISCHARGE

## 2022-01-01 RX ORDER — REGORAFENIB 40 MG/1
40 TABLET, FILM COATED ORAL
Qty: 84 | Refills: 5 | Status: DISCONTINUED | COMMUNITY
Start: 2021-01-01 | End: 2022-01-01

## 2022-01-01 RX ORDER — METOPROLOL TARTRATE 50 MG
1 TABLET ORAL
Qty: 0 | Refills: 0 | DISCHARGE

## 2022-01-01 RX ORDER — AZELASTINE HYDROCHLORIDE 137 UG/1
137 SPRAY, METERED NASAL
Qty: 1 | Refills: 2 | Status: DISCONTINUED | COMMUNITY
End: 2022-01-01

## 2022-01-01 RX ORDER — PREGABALIN 75 MG/1
75 CAPSULE ORAL
Qty: 60 | Refills: 2 | Status: DISCONTINUED | COMMUNITY
Start: 2022-01-01 | End: 2022-01-01

## 2022-01-01 RX ORDER — TRAMADOL HYDROCHLORIDE 50 MG/1
50 TABLET, COATED ORAL 3 TIMES DAILY
Qty: 90 | Refills: 0 | Status: ACTIVE | COMMUNITY
Start: 2022-01-01 | End: 1900-01-01

## 2022-01-01 RX ORDER — PREGABALIN 100 MG/1
100 CAPSULE ORAL
Qty: 60 | Refills: 2 | Status: ACTIVE | COMMUNITY
Start: 2022-01-01 | End: 1900-01-01

## 2022-01-01 RX ORDER — AZELASTINE HYDROCHLORIDE 137 UG/1
0.1 SPRAY, METERED NASAL TWICE DAILY
Qty: 1 | Refills: 5 | Status: DISCONTINUED | COMMUNITY
Start: 2022-01-01 | End: 2022-01-01

## 2022-01-01 RX ORDER — OMEPRAZOLE 40 MG/1
40 CAPSULE, DELAYED RELEASE ORAL TWICE DAILY
Qty: 60 | Refills: 11 | Status: ACTIVE | COMMUNITY
Start: 2020-10-28 | End: 1900-01-01

## 2022-01-01 RX ORDER — FLUTICASONE PROPIONATE 50 UG/1
50 SPRAY, METERED NASAL
Qty: 16 | Refills: 5 | Status: DISCONTINUED | COMMUNITY
Start: 2017-09-30 | End: 2022-01-01

## 2022-01-01 RX ORDER — TAMSULOSIN HYDROCHLORIDE 0.4 MG/1
0.4 CAPSULE ORAL
Qty: 90 | Refills: 1 | Status: DISCONTINUED | COMMUNITY
Start: 2021-01-01 | End: 2022-01-01

## 2022-01-01 RX ORDER — PREGABALIN 50 MG/1
50 CAPSULE ORAL
Qty: 60 | Refills: 0 | Status: DISCONTINUED | COMMUNITY
Start: 2022-01-01 | End: 2022-01-01

## 2022-01-01 RX ORDER — PROCHLORPERAZINE MALEATE 10 MG/1
10 TABLET ORAL EVERY 6 HOURS
Qty: 120 | Refills: 5 | Status: ACTIVE | COMMUNITY
Start: 2022-01-01 | End: 1900-01-01

## 2022-01-07 PROBLEM — C20 RECTAL CANCER: Status: ACTIVE | Noted: 2021-01-01

## 2022-01-07 NOTE — REVIEW OF SYSTEMS
[Rectal Pain: Grade 1 - Mild pain] : Rectal Pain: Grade 1 - Mild pain [Fatigue: Grade 1 - Fatigue relieved by rest] : Fatigue: Grade 1 - Fatigue relieved by rest

## 2022-01-07 NOTE — HISTORY OF PRESENT ILLNESS
[FreeTextEntry1] : The patient is a pleasant 56 year old male diagnosed with metastatic rectal cancer. He was diagnosed with Stage IV sigmoid/rectal cancer in 2019, June at age 54. Colonoscopy 6/28/2019 revealed multiple sessile polyps and a fungating, infiltrative large mass in the sigmoid colon. Pathology was positive for adenocarcinoma. CT showed nodules in lungs and liver lesions consistent with metastatic disease, the largest was 8 cm. Patient was treated at [facility] with FOLFIRI + BENJAMIN.  CT abdomen and pelvis on 8/3/21 demonstrated multiple pulmonary nodules comparing to previous scan in 2019 but overall interval decrease in size and number. Persistent multiple lesions of the liver with interval decrease in size and number. Mildly enlarged periportal lymph nodes without significant interval change. Circumferential wall thickening of the proximal to mid rectum consistent with known rectal carcinoma associated with apparent luminal narrowing. Soft tissue masses within the rectal mesocolon suspicious for local metastases or adenopathy, new since the prior examination. The overall caliber of the rectal mass appears somewhat decreased in transverse diameter compared with the prior CT of 2019. Mild improvement in previously noted enlarged lymph nodes in the retroperitoneum posterior to the head of the pancreas. \par  MRI rectum on 8/17/21  showed circumferential upper rectal tumor with invasion of the anterior peritoneal reflection, extramural vascular invasion, and bulky mesorectal and superior rectal lianet metastases. Indeterminate left common iliac node.  Anal canal involved. Suspicious extra-TME nodes present and indeterminate left common iliac node. He had a sigmoidoscopy performed on 8/17/21 which showed an ulcerated almost completely obstructing large mass in the sigmoid colon. The mass was circumferential measuring 8 cm in length (7 cm to 15 cm) and the scope could barely pass the mass.  He underwent a diverting colostomy by Dr. Novoa on 8/18/21. His pain is much improved and he is using Tylenol and motrin prn for pain at his tailbone. He has been receiving Irinotecan and Avastin [id number]. He presents to discuss the role of radiation therapy in his care. He completed a dose of 2500 cGy to the pelvis/rectum on 10/26/21. \par \par He consented to a 2 month PTE telehealth.He is scheduled for CT abd/pelvis 1/11/21 due to rising CEA. Reports mild rectal discomfort and diffuse joint pains and using Motrin daily. Not interested in medical cannabis. He is to begin PT. Colostomy bag. Some bleeding noted in the stool but he believes it is coming from the stoma site. Feeling well. CEA damian from 523.0 on 10/15/21 to 671.0 on 12/21/21.

## 2022-01-10 PROBLEM — J30.1 ALLERGIC RHINITIS DUE TO POLLEN: Status: ACTIVE | Noted: 2017-09-30

## 2022-01-10 PROBLEM — G50.1 ATYPICAL FACE PAIN: Status: ACTIVE | Noted: 2022-01-01

## 2022-01-10 PROBLEM — R09.82 PND (POST-NASAL DRIP): Status: ACTIVE | Noted: 2022-01-01

## 2022-01-10 PROBLEM — J31.0 CHRONIC RHINITIS: Status: ACTIVE | Noted: 2022-01-01

## 2022-01-10 NOTE — ASSESSMENT
[FreeTextEntry1] : 57 year old male presenting for evaluation.\par \par #Arthralgias:\par -Rad-onc, Heme-onc and CT chest/abdomen and pelvis reviewed\par -He is presenting with diffuse arthralgias, no joint pain illicit in exam today.\par -previous imaging negative for osseous metastasis.  \par -Patient counseled on aerobic exercise program with goal of moderate intensity 20 minutes/day.\par -He will start occupational therapy for shoulder range of motion stretching and modalities.  He reports he previously had x-rays performed by orthopedics which were negative for osseous metastasis.\par -Start pregabalin 50 mg twice daily\par -I stop #[id number]\par -Discussed risks and benefits of medical cannabis to improve pain symptoms.\par -Medical cannabis certification completed today. Provided cannabis education, overview of state program, and discussed adverse effects in detail. Counseled that vaporized cannabis is not the preferred route of administration due to the fact that both short-term and long-term risks associated with vaporizing oils are not yet fully understood.\par -I Stop # [id number]\par \par Follow up in 1 month.  \par

## 2022-01-10 NOTE — PROCEDURE
[FreeTextEntry6] : Indication:  Unable to adequately examine nasal passages and sinus drainage with anterior rhinoscopy.\par The patient has \par \par Scope # 99\par Mild septal deviation is present on direct visualization on either side.\par Both inferior nasal turbinates are moderate in size with normal  appearing mucosa. \par extensive crusting anterior nose bilateral\par The sinus endoscope was introduced into the right nares\par exam right middle meatus reveals no mucopus, polyps or inflammation.  The middle turbinate is unremarkable.\par The scope was advanced and the sphenoethmoid region was inspected.\par The superior meatus and nasal vault are unremarkable.  The nasopharynx is unremarkable without inflammation or mass\par The sinus endoscope was introduced into the left nares\par exam of the left middle meatus reveals no mucopus, polyps or inflammation and the left middle turbinate is unremarkable.\par The scope was advanced and the sphenoethmoid region was inspected.\par The left superior meatus and nasal vault are unremarkable.\par The fiberoptic scope was introduced to the posterior pharynx and exam of the endolarynx is wnl w good vocal cord mobility.\par No lesion noted in hypopharynx.  There is no erythema or edema of the posterior larynx to suggest reflux.\par \par

## 2022-01-10 NOTE — REVIEW OF SYSTEMS
[Nasal Congestion] : nasal congestion [Negative] : Heme/Lymph [Patient Intake Form Reviewed] : Patient intake form was reviewed

## 2022-01-10 NOTE — ASSESSMENT
[FreeTextEntry1] : nasal crusting and sicca\par using daily fluticasone and azelastine\par nightly bendryl\par rec saline irrigations\par lubrication tid w vaseline

## 2022-01-10 NOTE — DATA REVIEWED
[FreeTextEntry1] :     -10/13/2021 [facility] scan with POD rectum, lungs and liver: Numerous pulmonary\par     parenchymal nodules throughout both lungs. The largest lung masses in the posterior\par     aspect of the right upper lobe measures 2.4 x 2.3 cm. Multiple hypoechoic\par     liver masses. The largest mass in the posterior aspect of the dome of the right\par     hepatic lobe measures 8.8 x 7.9 cm, up from 8.1 x 6.2 cm previously. The remaining\par     hepatic lesions appear increased in size and number. Area of masslike thickening in the\par     distal sigmoid colon and rectum relative to previous exam. An adjacent pericolonic mass\par     2.8 x 3.1 cm, compatible with metastatic perirectal lymph node.

## 2022-01-10 NOTE — HISTORY OF PRESENT ILLNESS
[de-identified] : co nasal congestion and clear mucous\par used flonase  w some benefit\par and azelastine\par rare sinusitis\par now on chemo rx for colon cancer strviga\par reflux and daily omeprazole

## 2022-01-10 NOTE — PHYSICAL EXAM
[FreeTextEntry1] : Gen: Patient is A&O x 3, NAD\par HEENT: EOMI, hearing grossly normal\par Resp: regular, non - labored\par CV: pulses regular\par Skin: no rashes, erythema\par Lymph: no clubbing, cyanosis, edema, or palpable lymphadenopathy\par Inspection: no instability or misalignment\par ROM: full throughout\par Palpation:no tenderness to palpation on joints lines \par Sensation: intact to light touch\par Reflexes: 1+ and symmetric throughout\par Strength: 5/5 throughout\par Special tests: -Hawkin's sign, -Straight leg raise, no pain with internal/external hip rotation, no pain with lumbar spine rotation \par Gait: normal, non-antalgic\par \par

## 2022-01-10 NOTE — HISTORY OF PRESENT ILLNESS
[FreeTextEntry1] : Mr. Alex is a 57 year old male with history of stage IV rectosigmoid cancer, dx in June 2019.  S/p FOLFOX July 24, 2019 + Avastin August 21, 2019.  On maintenance 5-FU Elli until POD 9/11/20.  09/20 - present: FOLFIRI Elli [Held elli April 2021 - 08/3/21 due to proteinuria].  8/20/21 diverting colostomy with Dr. Roca s/t obstructive symptoms from worsening rectosigmoid tumor.  S/p short course palliative chemoradiation with Dr. Funes for palliation/ POD in rectum.  Given POD, started Regorafenib 11/2/21 for 21 days out of 28 days; on 11/16/21 increased to full dose 160mg.\par \par He reports he has had persistent arthralgias throughout his body.  Worse in the shoulder as well as the hand joint regions.  Also pain along the hips.  Denies any inciting events.  Reports he was evaluated by orthopedic surgery and had x-rays performed of his bilateral shoulders demonstrating osteoarthritis.  No fractures.  Denies any history of trauma.  In the past he is occasionally taking tramadol.  He is also taking Motrin and Tylenol for his pain.  He also reports he is having discomfort especially in his rectal area.  He denies any overt weakness numbness tingling or bowel bladder dysfunction.\par  \par \par

## 2022-02-07 NOTE — ASSESSMENT
[FreeTextEntry1] : 57 year old male presenting for evaluation.\par \par #Arthralgias:\par -Heme-onc and ENT notes reviewed.  CBC and CMP reviewed.  New imaging pending.  \par -He reports significant improvement in arthralgias and to cold sensitivity with pregabalin.\par -Increase pregabalin to 75 mg twice daily\par \par \par #Cancer associated pain/Neuropathic pain:\par -Pending trial of medical cannabis.\par \par Follow up in 1 month.  \par

## 2022-02-07 NOTE — PHYSICAL EXAM
[FreeTextEntry1] : Gen: Patient is A&O x 3, NAD\par HEENT: EOMI, hearing grossly normal\par Resp: regular, non - labored\par CV: pulses regular\par Skin: no rashes, erythema\par Lymph: no clubbing, cyanosis, edema, or palpable lymphadenopathy\par Inspection: no instability or misalignment\par ROM: full throughout\par Palpation:no tenderness to palpation on joints lines \par Sensation: intact to light touch\par Reflexes: 1+ and symmetric throughout\par Strength: 5/5 throughout\par Special tests: -Hawkin's sign, -Straight leg raise, no pain with lumbar spine rotation \par Gait: normal, non-antalgic\par \par

## 2022-02-07 NOTE — HISTORY OF PRESENT ILLNESS
[FreeTextEntry1] : Mr. Alex is a 57 year old male with history of stage IV rectosigmoid cancer, dx in June 2019.  S/p FOLFOX July 24, 2019 + Avastin August 21, 2019.  On maintenance 5-FU Elli until POD 9/11/20.  09/20 - present: FOLFIRI Elli [Held elli April 2021 - 08/3/21 due to proteinuria].  8/20/21 diverting colostomy with Dr. Roca s/t obstructive symptoms from worsening rectosigmoid tumor.  S/p short course palliative chemoradiation with Dr. Funes for palliation/ POD in rectum.  Given POD, started Regorafenib 11/2/21 for 21 days out of 28 days; on 11/16/21 increased to full dose 160mg.\par \par Since his last visit he started pregabalin 50 mg twice a day. Denies any side effects this medication. Reports significant improvement in his symptoms of arthralgias as well as neuropathy symptoms. He reports he is feeling less sensitivity to cold. He also reports that his rectal pain has improved, however occasionally still is having pain especially located around bowel movements. Denies any new overt weakness.\par  \par \par

## 2022-02-09 PROBLEM — M25.50 JOINT PAIN: Status: ACTIVE | Noted: 2021-01-01

## 2022-02-09 PROBLEM — J32.9 SINUSITIS, CHRONIC: Status: ACTIVE | Noted: 2021-01-01

## 2022-02-18 NOTE — HISTORY OF PRESENT ILLNESS
[Home] : at home, [unfilled] , at the time of the visit. [Medical Office: ([facility])___] : at the medical office located in  [Verbal consent obtained from patient] : the patient, [unfilled] [de-identified] : The patient was diagnosed with stage IV sigmoid / rectal cancer in June 2019 at the age of 54. He underwent a colonoscopy June 28, 2019 and multiple sessile polyps were found and a fungating and infiltrative partially obstructing large mass was found in the sigmoid colon from 15cm to 20cm, circumferential.  Pathology showed adenocarcinoma of colon. He was treated by Dr. Boston at [facility] [place] on FOLFIRI +jenae (JENAE HELD 4/2021), last treated on 7/23/2021.  CT A/P on 08/3/21 showed multiple pulmonary nodules consistent with metastases with overall interval decrease in the number of nodules compared with prior CT of 2019.  Interval decrease in the number and size of metastatic hepatic lesions with persistent multiple lesions. Mildly enlarged periportal lymph nodes without significant interval change. Circumferential wall thickening of the proximal to mid rectum consistent with known rectal carcinoma associated with apparent luminal narrowing. Soft tissue masses within the rectal mesocolon suspicious for local metastases or adenopathy, new since the prior examination. The overall caliber of the rectal mass appears somewhat decreased in transverse diameter compared with the prior CT of 2019. Mild improvement in previously noted enlarged lymph nodes in the retroperitoneum posterior to the head of the pancreas. \par \par On 8/17/21 an MRI rectum showed circumferential upper rectal tumor with invasion of the anterior peritoneal reflection, extramural vascular invasion, and bulky mesorectal and superior rectal lianet metastases. Indeterminate left common iliac node.  Anal canal involved. Suspicious extra-TME nodes present: equicocal-indeterminate left common iliac node. On 8/17/21 a sigmoidoscopy was performed and an ulcerated almost completely obstructing large mass was found in the sigmoid colon. The mass was circumferential. The mass measured eight cm in length (7 cm to 15 cm). The gastroscope barely was able to traverse the mass.  He is s/p lap sigmoid colostomy for large bowel obstruction 8/18/21. \par  [de-identified] : PMH of HTN, DM and stage IV rectal cancer, with metastasis to the liver and lung\par Social hx: never smoker; rarely ETOH\par Family Hx: sister (teenager) sarcoma [de-identified] : Pt presents today s/p diverting colostomy. States he continues having "cancer slloughing of tissue from the rectum", creates a feeling constipation when this happens, and feels relief after "sluffing BM". No BRBPR. He started Stivarga (Regorafenib) 11/2/21 for 21 days out of 28 days, on 11/16/21 increased to 160mg (goal dose). C/o freckled H/F and cracked open hands, applying lotion daily and is resolved with week off chemo. His BP is slightly elevated today, when he monitors at home also elevated. His headaches are  improved PCP doubled his Norvasc to 10mg, doubled losartan to 100mg and metoprolol to 75mg. Continues to watch his salt intake. He has  soft stools in colostomy. He denies mouth sores; + voice changes, stable; no grapefruit intake noted, dental work, he has none planned. He complains that his joint pain has returned, however he is now packing up his house and having more activity than normal. He also has an increase in fatigue, but doesn't feel its related to activity changes. Feels well overall.

## 2022-02-18 NOTE — RESULTS/DATA
[FreeTextEntry1] : 2/14/22 CT:  Progressive multistation lianet and hepatic metastatic disease\par \par 1/11/22 CT C/A/P:  Innumerable pulmonary metastatic lesions. The largest nodule on the right upper lobe nodule has increased in size, now measuring 2.8 x 2.7 cm. New enlarged preaortic nodes.  Progression of hepatic metastatic disease.  Slight interval enlargement of the mass in the rectal mesocolon.\par \par 10/13/2021 CT: Numerous pulmonary parenchymal nodules throughout both lungs.  The largest lung masses in the posterior aspect of the right upper lobe measures 2.4 x 2.3 cm.  Nodules at the bases of the lungs on previous CT are unchanged.  Multiple hypoechoic liver masses.  The largest mass in the posterior aspect of the dome of the right hepatic lobe measures 8.8 x 7.9 cm, up from 8.1 x 6.2 cm previously.  The remaining hepatic lesions appear increased in size and number compared with the previous study as well.  Area of masslike thickening in the distal sigmoid colon and rectum relative to previous exam.  An adjacent pericolonic mass measures 2.8 x 3.1 cm, compatible with metastatic perirectal lymph node.\par \par 8/17/21 Sigmoidoscopy:  An ulcerated almost completely obstructing large mass was found in the sigmoid colon. The mass was circumferential. The mass measured eight cm \par in length (7 cm to 15 cm). Oozing was present. The gastroscope (9.9 mm) barely was able to traverse the mass.  Area at the distal side was successfully injected with 3 mL Spot (carbon black) for tattooing.\par Impression: No specimens collected.\par \par 8/17/21 MRI Rectum: Circumferential upper rectal tumor with invasion of the anterior peritoneal reflection, extramural vascular invasion, and bulky mesorectal and superior rectal lianet metastases. Indeterminate left common iliac node. If anal canal involved Anal canal involved. Suspicious extra-TME nodes present: Equicocal-indeterminate left common iliac node. \par \par 8/17/21 Colonoscopy:  no specimens collected; impending obstruction. \par \par 8/3/21 CT A/P: Multiple pulmonary nodules consistent with metastases with overall interval decrease in the number of nodules compared with prior CT of 2019.   Interval decrease in the number and size of metastatic hepatic lesions with persistent multiple lesions as described. Mildly enlarged periportal lymph nodes without significant interval change. Circumferential wall thickening of the proximal to mid rectum consistent with known rectal carcinoma associated with apparent luminal narrowing. Soft tissue masses within the rectal mesocolon suspicious for local metastases or adenopathy, new since the prior examination. The overall caliber of the rectal mass appears somewhat decreased in transverse diameter compared with the prior CT of 2019. Mild improvement in previously noted enlarged lymph nodes in the retroperitoneum posterior to the head of the pancreas. \par \par 6/28/19 Path: Sigmoid:  Adenocarcinoma of colon, moderately to poorly differentiated, invasive. \par \par 6/28/19 Colonoscopy: A 10mm polyp was found in the ascending colon, polyp was sessile. A 10mm polyp was found in the hepatic flexure, polyp was sessile. A 5mm polyp was found in the proximal ascending colon, polyp was sessile. Three sessile polyps were found in the distal transverse colon, all 10mm in size. Non-bleeding hemorrhoids were found during retroflexion. The hemorrhoids were Grade 1. 2 sessile polyps were found in the sigmoid colon, both 10mm in size. A fungating and infiltrative partially obstructing large mass was was found in the sigmoid colon from 15cm to 20cm. The mass was partially circumferential. The mass measured 5cm in length, Oozing was present. Area was tattooed with an injection of 3ml of kenyetta ink at both the proximal and distal aspect of the mass.

## 2022-02-18 NOTE — REVIEW OF SYSTEMS
[Recent Change In Weight] : ~T no recent weight change [Vomiting] : no vomiting [Diarrhea] : no diarrhea [FreeTextEntry5] : headaches with elevated BP [FreeTextEntry7] : intermittent from rectum / sluffing

## 2022-02-28 PROBLEM — C18.9 ADENOCARCINOMA, COLON: Status: ACTIVE | Noted: 2019-07-01

## 2022-02-28 PROBLEM — I10 BENIGN ESSENTIAL HYPERTENSION: Status: ACTIVE | Noted: 2017-09-28

## 2022-02-28 PROBLEM — M25.50 ARTHRALGIA: Status: ACTIVE | Noted: 2022-01-01

## 2022-02-28 PROBLEM — K21.9 CHRONIC GERD: Status: ACTIVE | Noted: 2021-01-01

## 2022-02-28 PROBLEM — Z86.39 HISTORY OF TYPE 2 DIABETES MELLITUS: Status: RESOLVED | Noted: 2020-11-27 | Resolved: 2022-01-01

## 2022-02-28 NOTE — PLAN
[FreeTextEntry1] : the omeprazole dose was increased to bid and he can add Gas X if needed, his hypertension is under good control and no medication changes are needed, the recent oncology notes, lab reports and CT report were reviewed

## 2022-02-28 NOTE — PHYSICAL EXAM
[No Acute Distress] : no acute distress [Normal Voice/Communication] : normal voice/communication [Ill-Appearing] : ill-appearing [No Respiratory Distress] : no respiratory distress  [No Accessory Muscle Use] : no accessory muscle use [Clear to Auscultation] : lungs were clear to auscultation bilaterally [Normal Rate] : normal rate  [Regular Rhythm] : with a regular rhythm [Normal S1, S2] : normal S1 and S2 [No Edema] : there was no peripheral edema [Coordination Grossly Intact] : coordination grossly intact [de-identified] : staci complexion [de-identified] : colostomy in place, no tenderness on palpation of epigastric area

## 2022-02-28 NOTE — REVIEW OF SYSTEMS
[Fatigue] : fatigue [Abdominal Pain] : abdominal pain [Heartburn] : heartburn [Joint Pain] : joint pain [Joint Stiffness] : joint stiffness [Back Pain] : back pain [Headache] : no headache [Negative] : Heme/Lymph [FreeTextEntry7] : belching

## 2022-02-28 NOTE — ASSESSMENT
[FreeTextEntry1] : 57 year old male presenting for evaluation.\par \par #Arthralgias:\par -CT abdomen pelvis reviewed.\par -Hematology oncology notes reviewed, patient started on TAS\par -Start tramadol 50 mg 3 times daily as needed.  Discussed with patient may convert to oxycodone.\par -I stop #[id number]\par \par #Neuropathic pain:\par -Increase Lyrica to 100 mg twice daily, CMP reviewed, creatinine 1.08\par \par Follow up in 1 month.  \par

## 2022-02-28 NOTE — DATA REVIEWED
[FreeTextEntry1] : CT abdomen pelvis February 2022: In the bones multilevel degenerative changes throughout spine.  Stable small previously seen bone islands within the pelvis and proximal femurs.

## 2022-02-28 NOTE — HISTORY OF PRESENT ILLNESS
[FreeTextEntry1] : This visit was provided via Telehealth using real-time 2 way audio visual technology. The Patient, Roman Alex, was located at HOME,  ADDRESS, at the time of the visit. The provider Karlo Damon DO was located at the medical office located in [place] at the time of the visit. The patient Roman Alxe and provider participated in the telehealth encounter. Verbal consent for telehealth was given on 2/28/22 by the patient Roman Alex.\par \par \par \par \par Mr. Alex is a 57 year old male with history of stage IV rectosigmoid cancer, dx in June 2019.  S/p FOLFOX July 24, 2019 + Avastin August 21, 2019.  On maintenance 5-FU Elli until POD 9/11/20.  09/20 - present: FOLFIRI Elli [Held elli April 2021 - 08/3/21 due to proteinuria].  8/20/21 diverting colostomy with Dr. Roca s/t obstructive symptoms from worsening rectosigmoid tumor.  S/p short course palliative chemoradiation with Dr. Funes for palliation/ POD in rectum.  Given POD, started Regorafenib 11/2/21 for 21 days out of 28 days; on 11/16/21 increased to full dose 160mg.  Now on TAS.  \par \par He reports he is having more aching in his hip region.  He reports the Lyrica had been helping but now symptoms seem to be worsening.  Recent evaluation with oncology and had CT scans performed.  Denies any new overt weakness.  He previously has taken tramadol and thought that this was helpful for his symptoms.\par

## 2022-02-28 NOTE — HISTORY OF PRESENT ILLNESS
[FreeTextEntry1] : patient presents for b/p check  [de-identified] : He just started the new oral chemotherapy today.  He has been having more pain in his back, hips and other joints.  He started working again last week.  He brought in his BP monitor and it read 121/81 in the office.  He hasn't had any headaches.  He has had increase in heartburn and is belching more

## 2022-03-01 PROBLEM — Z93.3 COLOSTOMY STATUS: Status: ACTIVE | Noted: 2021-01-01

## 2022-03-01 NOTE — HISTORY OF PRESENT ILLNESS
[de-identified] : The patient was diagnosed with stage IV sigmoid / rectal cancer in June 2019 at the age of 54. He underwent a colonoscopy June 28, 2019 and multiple sessile polyps were found and a fungating and infiltrative partially obstructing large mass was found in the sigmoid colon from 15cm to 20cm, circumferential.  Pathology showed adenocarcinoma of colon. He was treated by Dr. Boston at [facility] [place] on FOLFIRI +jenae (JENAE HELD 4/2021), last treated on 7/23/2021.  CT A/P on 08/3/21 showed multiple pulmonary nodules consistent with metastases with overall interval decrease in the number of nodules compared with prior CT of 2019.  Interval decrease in the number and size of metastatic hepatic lesions with persistent multiple lesions. Mildly enlarged periportal lymph nodes without significant interval change. Circumferential wall thickening of the proximal to mid rectum consistent with known rectal carcinoma associated with apparent luminal narrowing. Soft tissue masses within the rectal mesocolon suspicious for local metastases or adenopathy, new since the prior examination. The overall caliber of the rectal mass appears somewhat decreased in transverse diameter compared with the prior CT of 2019. Mild improvement in previously noted enlarged lymph nodes in the retroperitoneum posterior to the head of the pancreas. \par \par On 8/17/21 an MRI rectum showed circumferential upper rectal tumor with invasion of the anterior peritoneal reflection, extramural vascular invasion, and bulky mesorectal and superior rectal lianet metastases. Indeterminate left common iliac node.  Anal canal involved. Suspicious extra-TME nodes present: equicocal-indeterminate left common iliac node. On 8/17/21 a sigmoidoscopy was performed and an ulcerated almost completely obstructing large mass was found in the sigmoid colon. The mass was circumferential. The mass measured eight cm in length (7 cm to 15 cm). The gastroscope barely was able to traverse the mass.  He is s/p lap sigmoid colostomy for large bowel obstruction 8/18/21. \par  [de-identified] : PMH of HTN, DM and stage IV rectal cancer, with metastasis to the liver and lung\par Social hx: never smoker; rarely ETOH\par Family Hx: sister (teenager) sarcoma [de-identified] : Pt presents urgently today. He continues having "cancer sloughing of tissue from the rectum", creates a feeling constipation when this happens, and feels relief after "sluffing BM". He reviewed symptom with colorectal, sluffing and is normal. Friday 2/25/22 had BRBPR , 2 episodes and last night as well with clots. He also c/o a bloating feeling in his abd for 3 days, making it painful to sleep. He reports it feels like fluid in abd (no ascites noted on scan). Unsure if he ate gassy foods prior to bloating symptoms. He started Stivarga (Regorafenib) 11/2/21 for 21 days out of 28 days, on 11/16/21 increased to 160mg (goal dose) ended 2/14/22. Started Lonsurf 20-8.19 mg started 2/28/22, today is C1D2. C/o SOB and leg burning pain climbing the stairs, continues even with 2 week chemo holiday. All activities feel exhausting, and needs to rest after.  Hemoglobin stable today. H/F resolved. His BP is normal today, did see PCP yesterday. Continues to check BP at home with symptomatic HA. He has soft stools in colostomy. He denies mouth sores, no grapefruit intake noted, dental work, he has none planned. No longer packing up his house, due to pain.\par \par via picture and statements only:\par lower 1/3rd of skin around stoma with darker / hard tissue, stoma pink and intact \par ordered larger colostomy bags to incorporate new growth

## 2022-03-01 NOTE — REVIEW OF SYSTEMS
[Fatigue] : fatigue [Constipation] : constipation [Negative] : Allergic/Immunologic [Insomnia] : insomnia [Recent Change In Weight] : ~T recent weight change [Abdominal Pain] : abdominal pain [Muscle Pain] : muscle pain [Vomiting] : no vomiting [Diarrhea] : no diarrhea [FreeTextEntry2] : wt loss 6 lbs  [FreeTextEntry5] : headaches with elevated BP [FreeTextEntry7] : intermittent from rectum / sluffing, now with BRBPR // bloating [FreeTextEntry9] : burning pain with activity

## 2022-03-01 NOTE — PHYSICAL EXAM
[Restricted in physically strenuous activity but ambulatory and able to carry out work of a light or sedentary nature] : Status 1- Restricted in physically strenuous activity but ambulatory and able to carry out work of a light or sedentary nature, e.g., light house work, office work [Normal] : affect appropriate [de-identified] : wt loss 6 lbs  [de-identified] : intact colostomy left side abd / no bloating noted  [de-identified] : sparkle under his own power, no assistance needed

## 2022-03-14 NOTE — HISTORY OF PRESENT ILLNESS
[Home] : at home, [unfilled] , at the time of the visit. [Medical Office: ([facility])___] : at the medical office located in  [Verbal consent obtained from patient] : the patient, [unfilled] [de-identified] : The patient was diagnosed with stage IV sigmoid / rectal cancer in June 2019 at the age of 54. He underwent a colonoscopy June 28, 2019 and multiple sessile polyps were found and a fungating and infiltrative partially obstructing large mass was found in the sigmoid colon from 15cm to 20cm, circumferential.  Pathology showed adenocarcinoma of colon. He was treated by Dr. Boston at [facility] [place] on FOLFIRI +jenae (JENAE HELD 4/2021), last treated on 7/23/2021.  CT A/P on 08/3/21 showed multiple pulmonary nodules consistent with metastases with overall interval decrease in the number of nodules compared with prior CT of 2019.  Interval decrease in the number and size of metastatic hepatic lesions with persistent multiple lesions. Mildly enlarged periportal lymph nodes without significant interval change. Circumferential wall thickening of the proximal to mid rectum consistent with known rectal carcinoma associated with apparent luminal narrowing. Soft tissue masses within the rectal mesocolon suspicious for local metastases or adenopathy, new since the prior examination. The overall caliber of the rectal mass appears somewhat decreased in transverse diameter compared with the prior CT of 2019. Mild improvement in previously noted enlarged lymph nodes in the retroperitoneum posterior to the head of the pancreas. \par \par On 8/17/21 an MRI rectum showed circumferential upper rectal tumor with invasion of the anterior peritoneal reflection, extramural vascular invasion, and bulky mesorectal and superior rectal lianet metastases. Indeterminate left common iliac node.  Anal canal involved. Suspicious extra-TME nodes present: equicocal-indeterminate left common iliac node. On 8/17/21 a sigmoidoscopy was performed and an ulcerated almost completely obstructing large mass was found in the sigmoid colon. The mass was circumferential. The mass measured eight cm in length (7 cm to 15 cm). The gastroscope barely was able to traverse the mass.  He is s/p lap sigmoid colostomy for large bowel obstruction 8/18/21. \par  [de-identified] : PMH of HTN, DM and stage IV rectal cancer, with metastasis to the liver and lung\par Social hx: never smoker; rarely ETOH\par Family Hx: sister (teenager) sarcoma [de-identified] : Pt presents urgently today. He continues having "cancer sloughing of tissue from the rectum", creates a feeling constipation when this happens, and feels relief after "sluffing BM". He reviewed symptom with colorectal, sluffing and is normal. Friday 2/25/22 had BRBPR , 2 episodes and last night as well with clots. He also c/o a bloating feeling in his abd for 3 days, making it painful to sleep. He reports it feels like fluid in abd (no ascites noted on scan). Unsure if he ate gassy foods prior to bloating symptoms. He started Stivarga (Regorafenib) 11/2/21 for 21 days out of 28 days, on 11/16/21 increased to 160mg (goal dose) ended 2/14/22. Started Lonsurf 20-8.19 mg started 2/28/22. C/o SOB and leg burning pain climbing the stairs, continues even with 2 week chemo holiday. All activities feel exhausting, and needs to rest after.  Hemoglobin stable today. H/F resolved. His BP is normal today, did see PCP yesterday. Continues to check BP at home with symptomatic HA. He has soft stools in colostomy. He denies mouth sores, no grapefruit intake noted, dental work, he has none planned. No longer packing up his house, due to pain.\par \par via picture and statements only 3/1/22:\par lower 1/3rd of skin around stoma with darker / hard tissue, stoma pink and intact \par ordered larger colostomy bags to incorporate new growth \par \par pt admitted to [facility] 3/8 - 2/9/22 for elevated bilirubin, was discharged with a bilirubin of 3/7, currently holding Lonsurf.

## 2022-03-14 NOTE — REVIEW OF SYSTEMS
[Fatigue] : fatigue [Recent Change In Weight] : ~T recent weight change [Abdominal Pain] : abdominal pain [Constipation] : constipation [Muscle Pain] : muscle pain [Insomnia] : insomnia [Negative] : Allergic/Immunologic [Vomiting] : no vomiting [Diarrhea] : no diarrhea [FreeTextEntry2] : wt loss 6 lbs  [FreeTextEntry5] : headaches with elevated BP [FreeTextEntry7] : intermittent from rectum / sluffing, now with BRBPR // bloating [FreeTextEntry9] : burning pain with activity

## 2022-03-14 NOTE — RESULTS/DATA
[FreeTextEntry1] : 3/8/22 MRI MRCP: Mild intrahepatic biliary duct dilatation to the level of the daniela hepatis likely secondary to lymphadenopathy. Normal common duct. Hepatosplenomegaly with extensive liver metastases as previously demonstrated. Small ascites. Right portal vein compression/occlusion\par \par 3/8/22 CT A/P: Progression of lung and hepatic metastatic disease and pathologic adenopathy. New ascites. Perforated rectal mass. No evidence of biliary dilation.\par \par 2/14/22 CT:  Progressive multistation lianet and hepatic metastatic disease\par \par 1/11/22 CT C/A/P:  Innumerable pulmonary metastatic lesions. The largest nodule on the right upper lobe nodule has increased in size, now measuring 2.8 x 2.7 cm. New enlarged preaortic nodes.  Progression of hepatic metastatic disease.  Slight interval enlargement of the mass in the rectal mesocolon.\par \par 10/13/2021 CT: Numerous pulmonary parenchymal nodules throughout both lungs.  The largest lung masses in the posterior aspect of the right upper lobe measures 2.4 x 2.3 cm.  Nodules at the bases of the lungs on previous CT are unchanged.  Multiple hypoechoic liver masses.  The largest mass in the posterior aspect of the dome of the right hepatic lobe measures 8.8 x 7.9 cm, up from 8.1 x 6.2 cm previously.  The remaining hepatic lesions appear increased in size and number compared with the previous study as well.  Area of masslike thickening in the distal sigmoid colon and rectum relative to previous exam.  An adjacent pericolonic mass measures 2.8 x 3.1 cm, compatible with metastatic perirectal lymph node.\par \par 8/17/21 Sigmoidoscopy:  An ulcerated almost completely obstructing large mass was found in the sigmoid colon. The mass was circumferential. The mass measured eight cm \par in length (7 cm to 15 cm). Oozing was present. The gastroscope (9.9 mm) barely was able to traverse the mass.  Area at the distal side was successfully injected with 3 mL Spot (carbon black) for tattooing.\par Impression: No specimens collected.\par \par 8/17/21 MRI Rectum: Circumferential upper rectal tumor with invasion of the anterior peritoneal reflection, extramural vascular invasion, and bulky mesorectal and superior rectal lianet metastases. Indeterminate left common iliac node. If anal canal involved Anal canal involved. Suspicious extra-TME nodes present: Equicocal-indeterminate left common iliac node. \par \par 8/17/21 Colonoscopy:  no specimens collected; impending obstruction. \par \par 8/3/21 CT A/P: Multiple pulmonary nodules consistent with metastases with overall interval decrease in the number of nodules compared with prior CT of 2019.   Interval decrease in the number and size of metastatic hepatic lesions with persistent multiple lesions as described. Mildly enlarged periportal lymph nodes without significant interval change. Circumferential wall thickening of the proximal to mid rectum consistent with known rectal carcinoma associated with apparent luminal narrowing. Soft tissue masses within the rectal mesocolon suspicious for local metastases or adenopathy, new since the prior examination. The overall caliber of the rectal mass appears somewhat decreased in transverse diameter compared with the prior CT of 2019. Mild improvement in previously noted enlarged lymph nodes in the retroperitoneum posterior to the head of the pancreas. \par \par 6/28/19 Path: Sigmoid:  Adenocarcinoma of colon, moderately to poorly differentiated, invasive. \par \par 6/28/19 Colonoscopy: A 10mm polyp was found in the ascending colon, polyp was sessile. A 10mm polyp was found in the hepatic flexure, polyp was sessile. A 5mm polyp was found in the proximal ascending colon, polyp was sessile. Three sessile polyps were found in the distal transverse colon, all 10mm in size. Non-bleeding hemorrhoids were found during retroflexion. The hemorrhoids were Grade 1. 2 sessile polyps were found in the sigmoid colon, both 10mm in size. A fungating and infiltrative partially obstructing large mass was was found in the sigmoid colon from 15cm to 20cm. The mass was partially circumferential. The mass measured 5cm in length, Oozing was present. Area was tattooed with an injection of 3ml of kenyetta ink at both the proximal and distal aspect of the mass.

## 2022-03-14 NOTE — PHYSICAL EXAM
[Restricted in physically strenuous activity but ambulatory and able to carry out work of a light or sedentary nature] : Status 1- Restricted in physically strenuous activity but ambulatory and able to carry out work of a light or sedentary nature, e.g., light house work, office work [Normal] : affect appropriate [de-identified] : wt loss 6 lbs  [de-identified] : intact colostomy left side abd / no bloating noted  [de-identified] : sparkle under his own power, no assistance needed

## 2022-03-21 NOTE — ASSESSMENT
[FreeTextEntry1] : 57 year old male presenting for evaluation.\par \par #Cancer associated pain:\par -Hematology oncology notes reviewed, patient starting on Folfox \par -Stop tramadol.  Start oxycodone 5mg BID prn.\par -I stop #[id number]\par \par #Neuropathic pain/Arthralgias:\par -Continue Lyrica to 100 mg twice daily\par \par #Impaired mobility:\par -Start home PT\par -Rx given for single point cane, discussed rollator but patient would like to defer\par \par Follow up in 1 month.  \par

## 2022-03-21 NOTE — PHYSICAL EXAM
[FreeTextEntry1] : Virtual exam:\par Gen: Patient is A&O x 3, NAD\par HEENT: EOMI, hearing grossly normal\par Resp: regular, non - labored\par CV: No cyanosis \par Skin: no rashes, erythema\par Lymph: no clubbing, cyanosis, edema, or palpable lymphadenopathy\par Inspection: no instability or misalignment\par ROM: full throughout\par Strength: anti-gravity throughout \par Gait: antalgic\par \par

## 2022-03-21 NOTE — DATA REVIEWED
[FreeTextEntry1] : CT Chest/abdomen/pelvis Feb 2022: Multilevel degenerative throughout the spine.  Stable small previously seen bone islands within pelvis and proximal femurs.

## 2022-03-21 NOTE — HISTORY OF PRESENT ILLNESS
[FreeTextEntry1] : This visit was provided via Telehealth using real-time 2 way audio visual technology. The Patient, Roman Alex, was located at HOME,  ADDRESS, at the time of the visit. The provider Karlo Damon DO was located at the medical office located in [place], [place] at the time of the visit. The patient Roman Alex and provider participated in the telehealth encounter. Verbal consent for telehealth was given on 3/21/22 by the patient Roman Alex.\par \par \par Mr. Alex is a 57 year old male with history of stage IV rectosigmoid cancer, dx in June 2019.  S/p FOLFOX July 24, 2019 + Avastin August 21, 2019.  On maintenance 5-FU Elli until POD 9/11/20.  09/20 - present: FOLFIRI Elli [Held elli April 2021 - 08/3/21 due to proteinuria].  8/20/21 diverting colostomy with Dr. Roca s/t obstructive symptoms from worsening rectosigmoid tumor.  S/p short course palliative chemoradiation with Dr. Funes for palliation/ POD in rectum.  Given POD, started Regorafenib 11/2/21 for 21 days out of 28 days; on 11/16/21 increased to full dose 160mg.  Was then on TAS stopped due to increase bilirubin, restarted on FOLFOX.    \par \par He now reports that his pain is controlled.  He is taking tramadol occasionally 1 time per day.  He increased his Lyrica and thinks that this is helpful.  He reports that he is experiencing more generalized weakness.  He reports decreased endurance.  He think he is having difficulty walking around his house due to weakness.  Denies any new numbness/tingling.

## 2022-04-05 ENCOUNTER — APPOINTMENT (OUTPATIENT)
Dept: INFUSION THERAPY | Facility: CLINIC | Age: 57
End: 2022-04-05

## 2022-04-05 ENCOUNTER — APPOINTMENT (OUTPATIENT)
Dept: HEMATOLOGY ONCOLOGY | Facility: CLINIC | Age: 57
End: 2022-04-05

## 2022-04-07 ENCOUNTER — APPOINTMENT (OUTPATIENT)
Dept: INFUSION THERAPY | Facility: CLINIC | Age: 57
End: 2022-04-07

## 2022-04-07 ENCOUNTER — APPOINTMENT (OUTPATIENT)
Dept: HEMATOLOGY ONCOLOGY | Facility: CLINIC | Age: 57
End: 2022-04-07

## 2022-04-07 PROBLEM — Z74.09 IMPAIRED MOBILITY AND ADLS: Status: ACTIVE | Noted: 2022-01-01

## 2022-04-07 PROBLEM — C79.9 METASTASIS FROM RECTAL CANCER: Status: ACTIVE | Noted: 2021-01-01

## 2022-04-07 PROBLEM — Z74.09 IMPAIRED FUNCTIONAL MOBILITY, BALANCE, GAIT, AND ENDURANCE: Status: ACTIVE | Noted: 2022-01-01

## 2022-04-07 PROBLEM — M79.2 NEUROPATHIC PAIN: Status: ACTIVE | Noted: 2022-01-01

## 2022-04-07 PROBLEM — K92.1 PASSAGE OF BLOODY STOOLS: Status: ACTIVE | Noted: 2022-01-01

## 2022-04-07 PROBLEM — G89.3 CANCER ASSOCIATED PAIN: Status: ACTIVE | Noted: 2022-01-01

## 2022-04-07 NOTE — PHYSICAL EXAM
[Restricted in physically strenuous activity but ambulatory and able to carry out work of a light or sedentary nature] : Status 1- Restricted in physically strenuous activity but ambulatory and able to carry out work of a light or sedentary nature, e.g., light house work, office work [Normal] : affect appropriate [de-identified] : wt loss 6 lbs  [de-identified] : intact colostomy left side abd / no bloating noted  [de-identified] : sparkle under his own power, no assistance needed

## 2022-04-07 NOTE — REVIEW OF SYSTEMS
[Fatigue] : fatigue [Recent Change In Weight] : ~T recent weight change [Abdominal Pain] : abdominal pain [Vomiting] : no vomiting [Constipation] : constipation [Diarrhea] : no diarrhea [Muscle Pain] : muscle pain [Insomnia] : insomnia [Negative] : Allergic/Immunologic [FreeTextEntry2] : wt loss 6 lbs  [FreeTextEntry5] : headaches with elevated BP [FreeTextEntry7] : intermittent from rectum / sluffing, now with BRBPR // bloating [FreeTextEntry9] : burning pain with activity

## 2022-04-07 NOTE — HISTORY OF PRESENT ILLNESS
[de-identified] : The patient was diagnosed with stage IV sigmoid / rectal cancer in June 2019 at the age of 54. He underwent a colonoscopy June 28, 2019 and multiple sessile polyps were found and a fungating and infiltrative partially obstructing large mass was found in the sigmoid colon from 15cm to 20cm, circumferential.  Pathology showed adenocarcinoma of colon. He was treated by Dr. Boston at [facility] on FOLFIRI +jenae (JENAE HELD 4/2021), last treated on 7/23/2021.  CT A/P on 08/3/21 showed multiple pulmonary nodules consistent with metastases with overall interval decrease in the number of nodules compared with prior CT of 2019.  Interval decrease in the number and size of metastatic hepatic lesions with persistent multiple lesions. Mildly enlarged periportal lymph nodes without significant interval change. Circumferential wall thickening of the proximal to mid rectum consistent with known rectal carcinoma associated with apparent luminal narrowing. Soft tissue masses within the rectal mesocolon suspicious for local metastases or adenopathy, new since the prior examination. The overall caliber of the rectal mass appears somewhat decreased in transverse diameter compared with the prior CT of 2019. Mild improvement in previously noted enlarged lymph nodes in the retroperitoneum posterior to the head of the pancreas. \par \par On 8/17/21 an MRI rectum showed circumferential upper rectal tumor with invasion of the anterior peritoneal reflection, extramural vascular invasion, and bulky mesorectal and superior rectal lianet metastases. Indeterminate left common iliac node.  Anal canal involved. Suspicious extra-TME nodes present: equicocal-indeterminate left common iliac node. On 8/17/21 a sigmoidoscopy was performed and an ulcerated almost completely obstructing large mass was found in the sigmoid colon. The mass was circumferential. The mass measured eight cm in length (7 cm to 15 cm). The gastroscope barely was able to traverse the mass.  He is s/p lap sigmoid colostomy for large bowel obstruction 8/18/21. \par  [de-identified] : PMH of HTN, DM and stage IV rectal cancer, with metastasis to the liver and lung\par Social hx: never smoker; rarely ETOH\par Family Hx: sister (teenager) sarcoma [de-identified] : Pt presents urgently today. He continues having "cancer sloughing of tissue from the rectum", creates a feeling constipation when this happens, and feels relief after "sluffing BM". He reviewed symptom with colorectal, sluffing and is normal. Friday 2/25/22 had BRBPR , 2 episodes and last night as well with clots. He also c/o a bloating feeling in his abd for 3 days, making it painful to sleep. He reports it feels like fluid in abd (no ascites noted on scan). Unsure if he ate gassy foods prior to bloating symptoms. He started Stivarga (Regorafenib) 11/2/21 for 21 days out of 28 days, on 11/16/21 increased to 160mg (goal dose) ended 2/14/22. Started Lonsurf 20-8.19 mg started 2/28/22. C/o SOB and leg burning pain climbing the stairs, continues even with 2 week chemo holiday. All activities feel exhausting, and needs to rest after.  Hemoglobin stable today. H/F resolved. His BP is normal today, did see PCP yesterday. Continues to check BP at home with symptomatic HA. He has soft stools in colostomy. He denies mouth sores, no grapefruit intake noted, dental work, he has none planned. No longer packing up his house, due to pain.\par \par via picture and statements only 3/1/22:\par lower 1/3rd of skin around stoma with darker / hard tissue, stoma pink and intact \par ordered larger colostomy bags to incorporate new growth \par \par pt admitted to [facility] 3/8 - 2/9/22 for elevated bilirubin, was discharged with a bilirubin of 3/7, currently holding Lonsurf.  [Home] : at home, [unfilled] , at the time of the visit. [Medical Office: ([facility])___] : at the medical office located in  [Verbal consent obtained from patient] : the patient, [unfilled]

## 2022-04-08 DIAGNOSIS — G89.3 NEOPLASM RELATED PAIN (ACUTE) (CHRONIC): ICD-10-CM

## 2022-04-08 DIAGNOSIS — K92.1 MELENA: ICD-10-CM

## 2022-04-08 DIAGNOSIS — Z74.09 OTHER REDUCED MOBILITY: ICD-10-CM

## 2022-04-08 DIAGNOSIS — C79.9 SECONDARY MALIGNANT NEOPLASM OF UNSPECIFIED SITE: ICD-10-CM

## 2022-04-08 DIAGNOSIS — M79.2 NEURALGIA AND NEURITIS, UNSPECIFIED: ICD-10-CM

## 2022-04-19 ENCOUNTER — APPOINTMENT (OUTPATIENT)
Dept: INFUSION THERAPY | Facility: CLINIC | Age: 57
End: 2022-04-19

## 2022-04-19 ENCOUNTER — APPOINTMENT (OUTPATIENT)
Dept: HEMATOLOGY ONCOLOGY | Facility: CLINIC | Age: 57
End: 2022-04-19

## 2022-04-21 ENCOUNTER — APPOINTMENT (OUTPATIENT)
Dept: INFUSION THERAPY | Facility: CLINIC | Age: 57
End: 2022-04-21

## 2022-06-28 ENCOUNTER — APPOINTMENT (OUTPATIENT)
Dept: FAMILY MEDICINE | Facility: CLINIC | Age: 57
End: 2022-06-28

## 2024-05-29 NOTE — RESULTS/DATA
[FreeTextEntry1] : 10/13/2021 CT: Numerous pulmonary parenchymal nodules throughout both lungs.  The largest lung masses in the posterior aspect of the right upper lobe measures 2.4 x 2.3 cm.  Nodules at the bases of the lungs on previous CT are unchanged.  Multiple hypoechoic liver masses.  The largest mass in the posterior aspect of the dome of the right hepatic lobe measures 8.8 x 7.9 cm, up from 8.1 x 6.2 cm previously.  The remaining hepatic lesions appear increased in size and number compared with the previous study as well.  Area of masslike thickening in the distal sigmoid colon and rectum relative to previous exam.  An adjacent pericolonic mass measures 2.8 x 3.1 cm, compatible with metastatic perirectal lymph node.\par \par 8/17/21 Sigmoidoscopy:  An ulcerated almost completely obstructing large mass was found in the sigmoid colon. The mass was circumferential. The mass measured eight cm \par in length (7 cm to 15 cm). Oozing was present. The gastroscope (9.9 mm) barely was able to traverse the mass.  Area at the distal side was successfully injected with 3 mL Spot (carbon black) for tattooing.\par Impression: No specimens collected.\par \par 8/17/21 MRI Rectum: Circumferential upper rectal tumor with invasion of the anterior peritoneal reflection, extramural vascular invasion, and bulky mesorectal and superior rectal lianet metastases. Indeterminate left common iliac node. If anal canal involved Anal canal involved. Suspicious extra-TME nodes present: Equicocal-indeterminate left common iliac node. \par \par 8/17/21 Colonoscopy:  no specimens collected; impending obstruction. \par \par 8/3/21 CT A/P: Multiple pulmonary nodules consistent with metastases with overall interval decrease in the number of nodules compared with prior CT of 2019.   Interval decrease in the number and size of metastatic hepatic lesions with persistent multiple lesions as described. Mildly enlarged periportal lymph nodes without significant interval change. Circumferential wall thickening of the proximal to mid rectum consistent with known rectal carcinoma associated with apparent luminal narrowing. Soft tissue masses within the rectal mesocolon suspicious for local metastases or adenopathy, new since the prior examination. The overall caliber of the rectal mass appears somewhat decreased in transverse diameter compared with the prior CT of 2019. Mild improvement in previously noted enlarged lymph nodes in the retroperitoneum posterior to the head of the pancreas. \par \par 6/28/19 Path: Sigmoid:  Adenocarcinoma of colon, moderately to poorly differentiated, invasive. \par \par 6/28/19 Colonoscopy: A 10mm polyp was found in the ascending colon, polyp was sessile. A 10mm polyp was found in the hepatic flexure, polyp was sessile. A 5mm polyp was found in the proximal ascending colon, polyp was sessile. Three sessile polyps were found in the distal transverse colon, all 10mm in size. Non-bleeding hemorrhoids were found during retroflexion. The hemorrhoids were Grade 1. 2 sessile polyps were found in the sigmoid colon, both 10mm in size. A fungating and infiltrative partially obstructing large mass was was found in the sigmoid colon from 15cm to 20cm. The mass was partially circumferential. The mass measured 5cm in length, Oozing was present. Area was tattooed with an injection of 3ml of kenyetta ink at both the proximal and distal aspect of the mass.  show